# Patient Record
Sex: FEMALE | Race: WHITE | NOT HISPANIC OR LATINO | Employment: OTHER | URBAN - METROPOLITAN AREA
[De-identification: names, ages, dates, MRNs, and addresses within clinical notes are randomized per-mention and may not be internally consistent; named-entity substitution may affect disease eponyms.]

---

## 2021-03-11 ENCOUNTER — TELEPHONE (OUTPATIENT)
Dept: TRANSPLANT | Facility: CLINIC | Age: 61
End: 2021-03-11

## 2021-03-19 ENCOUNTER — TELEPHONE (OUTPATIENT)
Dept: TRANSPLANT | Facility: CLINIC | Age: 61
End: 2021-03-19

## 2021-04-07 ENCOUNTER — TELEPHONE (OUTPATIENT)
Dept: HEPATOLOGY | Facility: CLINIC | Age: 61
End: 2021-04-07

## 2021-04-29 ENCOUNTER — LAB VISIT (OUTPATIENT)
Dept: LAB | Facility: HOSPITAL | Age: 61
End: 2021-04-29
Payer: COMMERCIAL

## 2021-04-29 ENCOUNTER — OFFICE VISIT (OUTPATIENT)
Dept: HEPATOLOGY | Facility: CLINIC | Age: 61
End: 2021-04-29
Payer: COMMERCIAL

## 2021-04-29 VITALS
RESPIRATION RATE: 16 BRPM | DIASTOLIC BLOOD PRESSURE: 77 MMHG | BODY MASS INDEX: 29.41 KG/M2 | SYSTOLIC BLOOD PRESSURE: 116 MMHG | OXYGEN SATURATION: 96 % | HEART RATE: 97 BPM | WEIGHT: 187.38 LBS | HEIGHT: 67 IN

## 2021-04-29 DIAGNOSIS — K74.00 LIVER FIBROSIS: ICD-10-CM

## 2021-04-29 DIAGNOSIS — R74.8 ELEVATED LIVER ENZYMES: ICD-10-CM

## 2021-04-29 DIAGNOSIS — M19.90 ARTHRITIS: ICD-10-CM

## 2021-04-29 DIAGNOSIS — K76.9 LIVER DISEASE, UNSPECIFIED: ICD-10-CM

## 2021-04-29 DIAGNOSIS — R74.8 ELEVATED LIVER ENZYMES: Primary | ICD-10-CM

## 2021-04-29 DIAGNOSIS — M79.7 FIBROMYALGIA: ICD-10-CM

## 2021-04-29 LAB
A1AT SERPL-MCNC: 203 MG/DL (ref 100–190)
AFP SERPL-MCNC: 2.8 NG/ML (ref 0–8.4)
ALBUMIN SERPL BCP-MCNC: 3.4 G/DL (ref 3.5–5.2)
ALP SERPL-CCNC: 294 U/L (ref 55–135)
ALT SERPL W/O P-5'-P-CCNC: 11 U/L (ref 10–44)
ANION GAP SERPL CALC-SCNC: 8 MMOL/L (ref 8–16)
AST SERPL-CCNC: 22 U/L (ref 10–40)
BASOPHILS # BLD AUTO: 0.08 K/UL (ref 0–0.2)
BASOPHILS NFR BLD: 1.1 % (ref 0–1.9)
BILIRUB SERPL-MCNC: 0.4 MG/DL (ref 0.1–1)
BUN SERPL-MCNC: 11 MG/DL (ref 8–23)
CALCIUM SERPL-MCNC: 9.1 MG/DL (ref 8.7–10.5)
CERULOPLASMIN SERPL-MCNC: 44 MG/DL (ref 15–45)
CHLORIDE SERPL-SCNC: 106 MMOL/L (ref 95–110)
CO2 SERPL-SCNC: 25 MMOL/L (ref 23–29)
CREAT SERPL-MCNC: 1.1 MG/DL (ref 0.5–1.4)
CREAT SERPL-MCNC: 1.1 MG/DL (ref 0.5–1.4)
DIFFERENTIAL METHOD: ABNORMAL
EOSINOPHIL # BLD AUTO: 0.5 K/UL (ref 0–0.5)
EOSINOPHIL NFR BLD: 7.2 % (ref 0–8)
ERYTHROCYTE [DISTWIDTH] IN BLOOD BY AUTOMATED COUNT: 15.7 % (ref 11.5–14.5)
EST. GFR  (AFRICAN AMERICAN): >60 ML/MIN/1.73 M^2
EST. GFR  (AFRICAN AMERICAN): >60 ML/MIN/1.73 M^2
EST. GFR  (NON AFRICAN AMERICAN): 54.3 ML/MIN/1.73 M^2
EST. GFR  (NON AFRICAN AMERICAN): 54.3 ML/MIN/1.73 M^2
GLUCOSE SERPL-MCNC: 80 MG/DL (ref 70–110)
HCT VFR BLD AUTO: 44.7 % (ref 37–48.5)
HGB BLD-MCNC: 14.5 G/DL (ref 12–16)
IGG SERPL-MCNC: 969 MG/DL (ref 650–1600)
IMM GRANULOCYTES # BLD AUTO: 0.01 K/UL (ref 0–0.04)
IMM GRANULOCYTES NFR BLD AUTO: 0.1 % (ref 0–0.5)
INR PPP: 0.9 (ref 0.8–1.2)
INR PPP: 0.9 (ref 0.8–1.2)
IRON SERPL-MCNC: 100 UG/DL (ref 30–160)
LYMPHOCYTES # BLD AUTO: 2.5 K/UL (ref 1–4.8)
LYMPHOCYTES NFR BLD: 35 % (ref 18–48)
MCH RBC QN AUTO: 31 PG (ref 27–31)
MCHC RBC AUTO-ENTMCNC: 32.4 G/DL (ref 32–36)
MCV RBC AUTO: 96 FL (ref 82–98)
MONOCYTES # BLD AUTO: 0.4 K/UL (ref 0.3–1)
MONOCYTES NFR BLD: 5.9 % (ref 4–15)
NEUTROPHILS # BLD AUTO: 3.7 K/UL (ref 1.8–7.7)
NEUTROPHILS NFR BLD: 50.7 % (ref 38–73)
NRBC BLD-RTO: 0 /100 WBC
PLATELET # BLD AUTO: 239 K/UL (ref 150–450)
PMV BLD AUTO: 9 FL (ref 9.2–12.9)
POTASSIUM SERPL-SCNC: 4.7 MMOL/L (ref 3.5–5.1)
PROT SERPL-MCNC: 7.2 G/DL (ref 6–8.4)
PROTHROMBIN TIME: 10.3 SEC (ref 9–12.5)
PROTHROMBIN TIME: 10.3 SEC (ref 9–12.5)
RBC # BLD AUTO: 4.68 M/UL (ref 4–5.4)
SATURATED IRON: 28 % (ref 20–50)
SODIUM SERPL-SCNC: 139 MMOL/L (ref 136–145)
TOTAL IRON BINDING CAPACITY: 361 UG/DL (ref 250–450)
TRANSFERRIN SERPL-MCNC: 244 MG/DL (ref 200–375)
WBC # BLD AUTO: 7.25 K/UL (ref 3.9–12.7)

## 2021-04-29 PROCEDURE — 99205 PR OFFICE/OUTPT VISIT, NEW, LEVL V, 60-74 MIN: ICD-10-PCS | Mod: S$GLB,,, | Performed by: INTERNAL MEDICINE

## 2021-04-29 PROCEDURE — 99214 OFFICE O/P EST MOD 30 MIN: CPT | Mod: PBBFAC | Performed by: INTERNAL MEDICINE

## 2021-04-29 PROCEDURE — 80053 COMPREHEN METABOLIC PANEL: CPT | Performed by: INTERNAL MEDICINE

## 2021-04-29 PROCEDURE — 99205 OFFICE O/P NEW HI 60 MIN: CPT | Mod: S$GLB,,, | Performed by: INTERNAL MEDICINE

## 2021-04-29 PROCEDURE — 83540 ASSAY OF IRON: CPT | Performed by: INTERNAL MEDICINE

## 2021-04-29 PROCEDURE — 82390 ASSAY OF CERULOPLASMIN: CPT | Performed by: INTERNAL MEDICINE

## 2021-04-29 PROCEDURE — 85610 PROTHROMBIN TIME: CPT | Performed by: INTERNAL MEDICINE

## 2021-04-29 PROCEDURE — 86038 ANTINUCLEAR ANTIBODIES: CPT | Performed by: INTERNAL MEDICINE

## 2021-04-29 PROCEDURE — 82105 ALPHA-FETOPROTEIN SERUM: CPT | Performed by: INTERNAL MEDICINE

## 2021-04-29 PROCEDURE — 85025 COMPLETE CBC W/AUTO DIFF WBC: CPT | Performed by: INTERNAL MEDICINE

## 2021-04-29 PROCEDURE — 86235 NUCLEAR ANTIGEN ANTIBODY: CPT | Performed by: INTERNAL MEDICINE

## 2021-04-29 PROCEDURE — 82784 ASSAY IGA/IGD/IGG/IGM EACH: CPT | Performed by: INTERNAL MEDICINE

## 2021-04-29 PROCEDURE — 99999 PR PBB SHADOW E&M-EST. PATIENT-LVL IV: ICD-10-PCS | Mod: PBBFAC,,, | Performed by: INTERNAL MEDICINE

## 2021-04-29 PROCEDURE — 80074 ACUTE HEPATITIS PANEL: CPT | Performed by: INTERNAL MEDICINE

## 2021-04-29 PROCEDURE — 99999 PR PBB SHADOW E&M-EST. PATIENT-LVL IV: CPT | Mod: PBBFAC,,, | Performed by: INTERNAL MEDICINE

## 2021-04-29 PROCEDURE — 36415 COLL VENOUS BLD VENIPUNCTURE: CPT | Performed by: INTERNAL MEDICINE

## 2021-04-29 PROCEDURE — 86256 FLUORESCENT ANTIBODY TITER: CPT | Mod: 91 | Performed by: INTERNAL MEDICINE

## 2021-04-29 PROCEDURE — 80321 ALCOHOLS BIOMARKERS 1OR 2: CPT | Performed by: INTERNAL MEDICINE

## 2021-04-29 PROCEDURE — 86376 MICROSOMAL ANTIBODY EACH: CPT | Performed by: INTERNAL MEDICINE

## 2021-04-29 PROCEDURE — 83516 IMMUNOASSAY NONANTIBODY: CPT | Mod: 59 | Performed by: INTERNAL MEDICINE

## 2021-04-29 RX ORDER — CELECOXIB 200 MG/1
200 CAPSULE ORAL 2 TIMES DAILY
COMMUNITY
Start: 2021-04-08 | End: 2022-10-05

## 2021-04-29 RX ORDER — CYCLOBENZAPRINE HCL 10 MG
10 TABLET ORAL DAILY
COMMUNITY
Start: 2020-10-01 | End: 2023-07-24

## 2021-04-29 RX ORDER — PRAVASTATIN SODIUM 20 MG/1
20 TABLET ORAL NIGHTLY
COMMUNITY
Start: 2020-10-01 | End: 2022-08-05 | Stop reason: ALTCHOICE

## 2021-04-29 RX ORDER — GABAPENTIN 600 MG/1
600 TABLET ORAL 3 TIMES DAILY
COMMUNITY
Start: 2020-10-01 | End: 2022-08-05 | Stop reason: ALTCHOICE

## 2021-04-29 RX ORDER — ESTRADIOL 1 MG/1
1 TABLET ORAL DAILY
COMMUNITY
Start: 2020-10-01 | End: 2022-05-17

## 2021-04-29 RX ORDER — FUROSEMIDE 20 MG/1
20 TABLET ORAL DAILY
COMMUNITY
Start: 2020-10-01 | End: 2022-10-05 | Stop reason: SDUPTHER

## 2021-04-29 RX ORDER — ZONISAMIDE 25 MG/1
25 CAPSULE ORAL DAILY
COMMUNITY
Start: 2020-10-01

## 2021-04-29 RX ORDER — DULOXETIN HYDROCHLORIDE 30 MG/1
30 CAPSULE, DELAYED RELEASE ORAL DAILY
COMMUNITY
Start: 2021-02-09 | End: 2022-05-17

## 2021-04-29 RX ORDER — LISINOPRIL 10 MG/1
10 TABLET ORAL DAILY
COMMUNITY
Start: 2020-10-01 | End: 2023-02-03 | Stop reason: SDUPTHER

## 2021-04-29 RX ORDER — ONDANSETRON 4 MG/1
4 TABLET, FILM COATED ORAL
COMMUNITY
Start: 2020-10-01 | End: 2022-05-17

## 2021-04-29 RX ORDER — OMEPRAZOLE 40 MG/1
40 CAPSULE, DELAYED RELEASE ORAL DAILY
COMMUNITY
Start: 2021-04-08 | End: 2022-10-05

## 2021-04-29 RX ORDER — LUBIPROSTONE 24 UG/1
24 CAPSULE, GELATIN COATED ORAL 2 TIMES DAILY
COMMUNITY
Start: 2021-01-27 | End: 2022-08-05

## 2021-04-29 RX ORDER — AMITRIPTYLINE HYDROCHLORIDE 50 MG/1
50 TABLET, FILM COATED ORAL DAILY
COMMUNITY
Start: 2020-10-01 | End: 2022-05-17

## 2021-04-29 RX ORDER — TOPIRAMATE 100 MG/1
100 TABLET, FILM COATED ORAL DAILY
COMMUNITY
Start: 2021-04-01 | End: 2022-05-17

## 2021-04-29 RX ORDER — HYDROMORPHONE HYDROCHLORIDE 4 MG/1
4 TABLET ORAL EVERY 6 HOURS PRN
COMMUNITY
Start: 2021-04-01 | End: 2022-05-17

## 2021-04-30 LAB
HAV IGM SERPL QL IA: NEGATIVE
HBV CORE IGM SERPL QL IA: NEGATIVE
HBV SURFACE AG SERPL QL IA: NEGATIVE
HCV AB SERPL QL IA: NEGATIVE
MITOCHONDRIA AB TITR SER IF: NORMAL {TITER}
SMOOTH MUSCLE AB TITR SER IF: NORMAL {TITER}

## 2021-05-01 LAB — ANA SER QL IF: NORMAL

## 2021-05-03 LAB — LKM AB SER-ACNC: 1.2 UNITS

## 2021-05-04 LAB
GLIADIN PEPTIDE IGA SER-ACNC: 7 UNITS
GLIADIN PEPTIDE IGG SER-ACNC: 2 UNITS
IGA SERPL-MCNC: 249 MG/DL (ref 70–400)
PHOSPHATIDYLETHANOL (PETH): NEGATIVE NG/ML
TTG IGA SER-ACNC: 6 UNITS
TTG IGG SER-ACNC: 2 UNITS

## 2021-05-07 ENCOUNTER — HOSPITAL ENCOUNTER (OUTPATIENT)
Dept: RADIOLOGY | Facility: HOSPITAL | Age: 61
Discharge: HOME | End: 2021-05-07
Attending: INTERNAL MEDICINE
Payer: COMMERCIAL

## 2021-05-07 ENCOUNTER — TELEPHONE (OUTPATIENT)
Dept: HEPATOLOGY | Facility: CLINIC | Age: 61
End: 2021-05-07

## 2021-05-07 DIAGNOSIS — K76.9 LIVER DISEASE, UNSPECIFIED: ICD-10-CM

## 2021-05-07 DIAGNOSIS — K74.00 LIVER FIBROSIS: ICD-10-CM

## 2021-05-07 PROCEDURE — A9698 NON-RAD CONTRAST MATERIALNOC: HCPCS | Performed by: INTERNAL MEDICINE

## 2021-05-07 PROCEDURE — 74170 CT ABD WO CNTRST FLWD CNTRST: CPT | Mod: TC

## 2021-05-07 PROCEDURE — 74170 CT ABDOMEN W WO CONTRAST: ICD-10-PCS | Mod: 26,,, | Performed by: RADIOLOGY

## 2021-05-07 PROCEDURE — 74170 CT ABD WO CNTRST FLWD CNTRST: CPT | Mod: 26,,, | Performed by: RADIOLOGY

## 2021-05-07 PROCEDURE — 25500020 PHARM REV CODE 255: Performed by: INTERNAL MEDICINE

## 2021-05-07 RX ADMIN — IOHEXOL 500 ML: 9 SOLUTION ORAL at 07:05

## 2021-05-07 RX ADMIN — IOHEXOL 75 ML: 350 INJECTION, SOLUTION INTRAVENOUS at 08:05

## 2021-10-14 ENCOUNTER — TELEPHONE (OUTPATIENT)
Dept: HEPATOLOGY | Facility: CLINIC | Age: 61
End: 2021-10-14

## 2021-11-03 ENCOUNTER — LAB VISIT (OUTPATIENT)
Dept: LAB | Facility: HOSPITAL | Age: 61
End: 2021-11-03
Attending: INTERNAL MEDICINE
Payer: COMMERCIAL

## 2021-11-03 ENCOUNTER — OFFICE VISIT (OUTPATIENT)
Dept: HEPATOLOGY | Facility: CLINIC | Age: 61
End: 2021-11-03
Payer: COMMERCIAL

## 2021-11-03 VITALS
DIASTOLIC BLOOD PRESSURE: 65 MMHG | TEMPERATURE: 98 F | HEIGHT: 67 IN | OXYGEN SATURATION: 98 % | WEIGHT: 171.31 LBS | HEART RATE: 82 BPM | SYSTOLIC BLOOD PRESSURE: 105 MMHG | RESPIRATION RATE: 18 BRPM | BODY MASS INDEX: 26.89 KG/M2

## 2021-11-03 DIAGNOSIS — R74.8 ELEVATED LIVER ENZYMES: ICD-10-CM

## 2021-11-03 DIAGNOSIS — K74.00 LIVER FIBROSIS: ICD-10-CM

## 2021-11-03 DIAGNOSIS — K76.0 FATTY LIVER: ICD-10-CM

## 2021-11-03 DIAGNOSIS — K76.9 LIVER LESION: Primary | ICD-10-CM

## 2021-11-03 LAB
ALBUMIN SERPL BCP-MCNC: 3.8 G/DL (ref 3.5–5.2)
ALP SERPL-CCNC: 385 U/L (ref 55–135)
ALT SERPL W/O P-5'-P-CCNC: 17 U/L (ref 10–44)
ANION GAP SERPL CALC-SCNC: 11 MMOL/L (ref 8–16)
AST SERPL-CCNC: 27 U/L (ref 10–40)
BASOPHILS # BLD AUTO: 0.13 K/UL (ref 0–0.2)
BASOPHILS NFR BLD: 0.9 % (ref 0–1.9)
BILIRUB SERPL-MCNC: 0.5 MG/DL (ref 0.1–1)
BUN SERPL-MCNC: 13 MG/DL (ref 8–23)
CALCIUM SERPL-MCNC: 10 MG/DL (ref 8.7–10.5)
CHLORIDE SERPL-SCNC: 104 MMOL/L (ref 95–110)
CO2 SERPL-SCNC: 26 MMOL/L (ref 23–29)
CREAT SERPL-MCNC: 1.2 MG/DL (ref 0.5–1.4)
DIFFERENTIAL METHOD: ABNORMAL
EOSINOPHIL # BLD AUTO: 1.1 K/UL (ref 0–0.5)
EOSINOPHIL NFR BLD: 8 % (ref 0–8)
ERYTHROCYTE [DISTWIDTH] IN BLOOD BY AUTOMATED COUNT: 13.9 % (ref 11.5–14.5)
EST. GFR  (AFRICAN AMERICAN): 56.4 ML/MIN/1.73 M^2
EST. GFR  (NON AFRICAN AMERICAN): 48.9 ML/MIN/1.73 M^2
GLUCOSE SERPL-MCNC: 74 MG/DL (ref 70–110)
HCT VFR BLD AUTO: 46.3 % (ref 37–48.5)
HGB BLD-MCNC: 14.9 G/DL (ref 12–16)
IMM GRANULOCYTES # BLD AUTO: 0.04 K/UL (ref 0–0.04)
IMM GRANULOCYTES NFR BLD AUTO: 0.3 % (ref 0–0.5)
INR PPP: 0.9 (ref 0.8–1.2)
LYMPHOCYTES # BLD AUTO: 3.5 K/UL (ref 1–4.8)
LYMPHOCYTES NFR BLD: 24.3 % (ref 18–48)
MCH RBC QN AUTO: 33 PG (ref 27–31)
MCHC RBC AUTO-ENTMCNC: 32.2 G/DL (ref 32–36)
MCV RBC AUTO: 103 FL (ref 82–98)
MONOCYTES # BLD AUTO: 1.1 K/UL (ref 0.3–1)
MONOCYTES NFR BLD: 7.6 % (ref 4–15)
NEUTROPHILS # BLD AUTO: 8.4 K/UL (ref 1.8–7.7)
NEUTROPHILS NFR BLD: 58.9 % (ref 38–73)
NRBC BLD-RTO: 0 /100 WBC
PLATELET # BLD AUTO: 229 K/UL (ref 150–450)
PMV BLD AUTO: 9.2 FL (ref 9.2–12.9)
POTASSIUM SERPL-SCNC: 4.6 MMOL/L (ref 3.5–5.1)
PROT SERPL-MCNC: 7.2 G/DL (ref 6–8.4)
PROTHROMBIN TIME: 10 SEC (ref 9–12.5)
RBC # BLD AUTO: 4.51 M/UL (ref 4–5.4)
SODIUM SERPL-SCNC: 141 MMOL/L (ref 136–145)
WBC # BLD AUTO: 14.31 K/UL (ref 3.9–12.7)

## 2021-11-03 PROCEDURE — 3078F PR MOST RECENT DIASTOLIC BLOOD PRESSURE < 80 MM HG: ICD-10-PCS | Mod: CPTII,S$GLB,, | Performed by: INTERNAL MEDICINE

## 2021-11-03 PROCEDURE — 85610 PROTHROMBIN TIME: CPT | Performed by: INTERNAL MEDICINE

## 2021-11-03 PROCEDURE — 80053 COMPREHEN METABOLIC PANEL: CPT | Performed by: INTERNAL MEDICINE

## 2021-11-03 PROCEDURE — 3074F SYST BP LT 130 MM HG: CPT | Mod: CPTII,S$GLB,, | Performed by: INTERNAL MEDICINE

## 2021-11-03 PROCEDURE — 3078F DIAST BP <80 MM HG: CPT | Mod: CPTII,S$GLB,, | Performed by: INTERNAL MEDICINE

## 2021-11-03 PROCEDURE — 4010F PR ACE/ARB THEARPY RXD/TAKEN: ICD-10-PCS | Mod: CPTII,S$GLB,, | Performed by: INTERNAL MEDICINE

## 2021-11-03 PROCEDURE — 99999 PR PBB SHADOW E&M-EST. PATIENT-LVL IV: CPT | Mod: PBBFAC,,, | Performed by: INTERNAL MEDICINE

## 2021-11-03 PROCEDURE — 1159F PR MEDICATION LIST DOCUMENTED IN MEDICAL RECORD: ICD-10-PCS | Mod: CPTII,S$GLB,, | Performed by: INTERNAL MEDICINE

## 2021-11-03 PROCEDURE — 4010F ACE/ARB THERAPY RXD/TAKEN: CPT | Mod: CPTII,S$GLB,, | Performed by: INTERNAL MEDICINE

## 2021-11-03 PROCEDURE — 3008F PR BODY MASS INDEX (BMI) DOCUMENTED: ICD-10-PCS | Mod: CPTII,S$GLB,, | Performed by: INTERNAL MEDICINE

## 2021-11-03 PROCEDURE — 3074F PR MOST RECENT SYSTOLIC BLOOD PRESSURE < 130 MM HG: ICD-10-PCS | Mod: CPTII,S$GLB,, | Performed by: INTERNAL MEDICINE

## 2021-11-03 PROCEDURE — 36415 COLL VENOUS BLD VENIPUNCTURE: CPT | Performed by: INTERNAL MEDICINE

## 2021-11-03 PROCEDURE — 99215 OFFICE O/P EST HI 40 MIN: CPT | Mod: S$GLB,,, | Performed by: INTERNAL MEDICINE

## 2021-11-03 PROCEDURE — 99999 PR PBB SHADOW E&M-EST. PATIENT-LVL IV: ICD-10-PCS | Mod: PBBFAC,,, | Performed by: INTERNAL MEDICINE

## 2021-11-03 PROCEDURE — 85025 COMPLETE CBC W/AUTO DIFF WBC: CPT | Performed by: INTERNAL MEDICINE

## 2021-11-03 PROCEDURE — 3008F BODY MASS INDEX DOCD: CPT | Mod: CPTII,S$GLB,, | Performed by: INTERNAL MEDICINE

## 2021-11-03 PROCEDURE — 1159F MED LIST DOCD IN RCRD: CPT | Mod: CPTII,S$GLB,, | Performed by: INTERNAL MEDICINE

## 2021-11-03 PROCEDURE — 99215 PR OFFICE/OUTPT VISIT, EST, LEVL V, 40-54 MIN: ICD-10-PCS | Mod: S$GLB,,, | Performed by: INTERNAL MEDICINE

## 2021-11-03 RX ORDER — TRAZODONE HYDROCHLORIDE 50 MG/1
TABLET ORAL
COMMUNITY
End: 2023-02-03

## 2022-02-14 ENCOUNTER — TELEPHONE (OUTPATIENT)
Dept: HEPATOLOGY | Facility: CLINIC | Age: 62
End: 2022-02-14
Payer: COMMERCIAL

## 2022-02-14 ENCOUNTER — NURSE TRIAGE (OUTPATIENT)
Dept: ADMINISTRATIVE | Facility: CLINIC | Age: 62
End: 2022-02-14
Payer: COMMERCIAL

## 2022-02-14 NOTE — TELEPHONE ENCOUNTER
Call returned to the patient. Patient stated she has been turned down 4 x's for [employer] due to being on mycophenolate, Txp, Txp candidate.  Message relayed from Dr Montgomery:  She is not transplant candidate and we never prescribed her MMF. Insurance may be confusing. We can send last note from hepatology to her insurance. I don't think her records from [facility] mention anything like that.    Patient stated she will try to reach out to her Pharmacy and will check with Providers in [place].

## 2022-02-14 NOTE — TELEPHONE ENCOUNTER
I called the patient and left her a message to call me back at [phone]     Marty Montgomery MD  Transplant Hepatology   Gastroenterology  [facility]  Office:[phone]  Fax:[phone]

## 2022-02-14 NOTE — TELEPHONE ENCOUNTER
Patient is calling today because she is applying for life insurance and she keeps getting turned down, and the reason that they give is that she has had a liver transplant or that she is on mycophenolate.  She confirms that she has not had a liver txp and I can not find any evidence on her medication record that she has ever rec'd mycophenolate.  She is wondering if there's a mistake in her records or if she did receive this medication at any point, would like Dr. Montgomery to clarify for her, and see if there's anything that can be done.  Please call her back @ [phone]   Reason for Disposition   Caller has NON-URGENT medicine question about med that PCP or specialist prescribed and triager unable to answer question    Additional Information   Negative: Intentional drug overdose and suicidal thoughts or ideas   Negative: MORE THAN A DOUBLE DOSE of a prescription or over-the-counter (OTC) drug   Negative: DOUBLE DOSE (an extra dose or lesser amount) of prescription drug and any symptoms (e.g., dizziness, nausea, pain, sleepiness)   Negative: DOUBLE DOSE (an extra dose or lesser amount) of over-the-counter (OTC) drug and any symptoms (e.g., dizziness, nausea, pain, sleepiness)   Negative: Took another person's prescription drug   Negative: DOUBLE DOSE (an extra dose or lesser amount) of prescription drug and NO symptoms (Exception: a double dose of antibiotics)   Negative: Diabetes drug error or overdose (e.g., took wrong type of insulin or took extra dose)   Negative: Caller has medication question about med not prescribed by PCP and triager unable to answer question (e.g., compatibility with other med, storage)   Negative: Prescription refill request for ESSENTIAL medicine (i.e., likelihood of harm to patient if not taken) and triager unable to refill per department policy   Negative: Prescription not at pharmacy and was prescribed by PCP recently (Exception: triager has access to EMR and prescription  is recorded there. Go to Home Care and confirm for pharmacy.)   Negative: Pharmacy calling with prescription question and triager unable to answer question   Negative: Caller has URGENT medicine question about med that PCP or specialist prescribed and triager unable to answer question    Protocols used: MEDICATION QUESTION CALL-A-[place]

## 2022-05-02 ENCOUNTER — TELEPHONE (OUTPATIENT)
Dept: HEPATOLOGY | Facility: CLINIC | Age: 62
End: 2022-05-02
Payer: COMMERCIAL

## 2022-05-02 DIAGNOSIS — K76.0 FATTY LIVER: ICD-10-CM

## 2022-05-02 DIAGNOSIS — R74.8 ELEVATED LIVER ENZYMES: ICD-10-CM

## 2022-05-02 DIAGNOSIS — K74.00 LIVER FIBROSIS: Primary | ICD-10-CM

## 2022-05-02 DIAGNOSIS — K76.9 LIVER LESION: ICD-10-CM

## 2022-05-14 ENCOUNTER — PATIENT MESSAGE (OUTPATIENT)
Dept: HEPATOLOGY | Facility: CLINIC | Age: 62
End: 2022-05-14
Payer: COMMERCIAL

## 2022-05-17 ENCOUNTER — TELEPHONE (OUTPATIENT)
Dept: HEPATOLOGY | Facility: CLINIC | Age: 62
End: 2022-05-17
Payer: COMMERCIAL

## 2022-05-17 ENCOUNTER — OFFICE VISIT (OUTPATIENT)
Dept: HEPATOLOGY | Facility: CLINIC | Age: 62
End: 2022-05-17
Payer: COMMERCIAL

## 2022-05-17 DIAGNOSIS — D18.03 HEPATIC HEMANGIOMA: ICD-10-CM

## 2022-05-17 DIAGNOSIS — K76.0 FATTY LIVER: ICD-10-CM

## 2022-05-17 DIAGNOSIS — R74.8 ELEVATED ALKALINE PHOSPHATASE LEVEL: ICD-10-CM

## 2022-05-17 DIAGNOSIS — K74.00 LIVER FIBROSIS: Primary | ICD-10-CM

## 2022-05-17 PROBLEM — D18.00 HEMANGIOMA: Status: ACTIVE | Noted: 2022-05-17

## 2022-05-17 PROBLEM — K76.9 LIVER LESION: Status: ACTIVE | Noted: 2022-05-17

## 2022-05-17 PROCEDURE — 1160F RVW MEDS BY RX/DR IN RCRD: CPT | Mod: CPTII,95,, | Performed by: NURSE PRACTITIONER

## 2022-05-17 PROCEDURE — 4010F ACE/ARB THERAPY RXD/TAKEN: CPT | Mod: CPTII,95,, | Performed by: NURSE PRACTITIONER

## 2022-05-17 PROCEDURE — 1160F PR REVIEW ALL MEDS BY PRESCRIBER/CLIN PHARMACIST DOCUMENTED: ICD-10-PCS | Mod: CPTII,95,, | Performed by: NURSE PRACTITIONER

## 2022-05-17 PROCEDURE — 1159F MED LIST DOCD IN RCRD: CPT | Mod: CPTII,95,, | Performed by: NURSE PRACTITIONER

## 2022-05-17 PROCEDURE — 99214 OFFICE O/P EST MOD 30 MIN: CPT | Mod: 95,,, | Performed by: NURSE PRACTITIONER

## 2022-05-17 PROCEDURE — 1159F PR MEDICATION LIST DOCUMENTED IN MEDICAL RECORD: ICD-10-PCS | Mod: CPTII,95,, | Performed by: NURSE PRACTITIONER

## 2022-05-17 PROCEDURE — 99214 PR OFFICE/OUTPT VISIT, EST, LEVL IV, 30-39 MIN: ICD-10-PCS | Mod: 95,,, | Performed by: NURSE PRACTITIONER

## 2022-05-17 PROCEDURE — 4010F PR ACE/ARB THEARPY RXD/TAKEN: ICD-10-PCS | Mod: CPTII,95,, | Performed by: NURSE PRACTITIONER

## 2022-05-17 NOTE — TELEPHONE ENCOUNTER
Returned to patient's call. Informed her that we received the results of her labs taken on 5/6. She is scheduled for a virtual visit today so she might need to schedule fibroscan in some other time.

## 2022-05-17 NOTE — PATIENT INSTRUCTIONS
- Schedule MRI of the abdomen with MRCP to evaluate elevated alkaline phosphatase.  - Repeat liver function tests in 3 months.  - Will also check titer levels for Hepatitis A and B, and arrange for vaccination if needed.  - Avoid alcohol and herbal supplements/alternative remedies.  - Return to clinic to be determined by imaging results.

## 2022-05-17 NOTE — PROGRESS NOTES
The patient location is: Home ([place], [place])  The chief complaint leading to consultation is: Liver fibrosis    Visit type: audiovisual - the majority of the visit was conducted by phone, due to audio issues on the provider's end.    Face to Face time with patient: 15  30 minutes of total time spent on the encounter, which includes face to face time and non-face to face time preparing to see the patient (eg, review of tests), Obtaining and/or reviewing separately obtained history, Documenting clinical information in the electronic or other health record, Independently interpreting results (not separately reported) and communicating results to the patient/family/caregiver, or Care coordination (not separately reported).     Each patient to whom he or she provides medical services by telemedicine is:  (1) informed of the relationship between the physician and patient and the respective role of any other health care provider with respect to management of the patient; and (2) notified that he or she may decline to receive medical services by telemedicine and may withdraw from such care at any time.    Notes:       [facility] VISIT ESTABLISHED PT NOTE    REFERRING PROVIDER:  No ref. provider found    CHIEF COMPLAINT: Liver fibrosis    HPI: This is a 61 y.o. White female with PMH noted below, presenting for follow up for liver fibrosis. She was last seen in clinic by Dr. Montgomery in 11/2021. She has a history of fatty liver with stage 3 fibrosis on liver biopsy in 10/2020. The liver biopsy was done during laproscopic cholecystectomy on 10/27/2020, and showed chronic portal inflammation with interface activity, with bridging fibrosis. Serological work up was negative for autoimmune and viral etiologies. Recent labs show normal liver function, with a persistently elevated alkaline phosphatase (> 300); her transaminases are normal. She also had an incidental finding of hemangiomas on cross sectional imaging  (last done in 5/2021). She does not have any new complains from liver standpoint. She is well appearing, and denies any signs or symptoms of hepatic decompensation including jaundice, dark urine, abdominal distention, lower extremity edema, hematemesis, melena, or periods of confusion suggestive of hepatic encephalopathy.     Review of patient's allergies indicates:   Allergen Reactions    Sumatriptan succinate      Current Outpatient Medications on File Prior to Visit   Medication Sig Dispense Refill    AMITIZA 24 mcg Cap Take 24 mcg by mouth 2 (two) times daily.      celecoxib (CELEBREX) 200 MG capsule Take 200 mg by mouth 2 (two) times daily.      cyclobenzaprine (FLEXERIL) 10 MG tablet Take 10 mg by mouth once daily.      furosemide (LASIX) 20 MG tablet Take 20 mg by mouth once daily.      gabapentin (NEURONTIN) 600 MG tablet Take 600 mg by mouth 3 (three) times daily.      lisinopriL 10 MG tablet Take 10 mg by mouth once daily.      omeprazole (PRILOSEC) 40 MG capsule Take 40 mg by mouth once daily.      pravastatin (PRAVACHOL) 20 MG tablet Take 20 mg by mouth every evening.      traZODone (DESYREL) 50 MG tablet trazodone 50 mg tablet      zonisamide (ZONEGRAN) 25 MG Cap Take 25 mg by mouth once daily.      [DISCONTINUED] amitriptyline (ELAVIL) 50 MG tablet Take 50 mg by mouth once daily.      [DISCONTINUED] DULoxetine (CYMBALTA) 30 MG capsule Take 30 mg by mouth once daily.      [DISCONTINUED] estradioL (ESTRACE) 1 MG tablet Take 1 mg by mouth once daily.      [DISCONTINUED] HYDROmorphone (DILAUDID) 4 MG tablet Take 4 mg by mouth every 6 (six) hours as needed.      [DISCONTINUED] ondansetron (ZOFRAN) 4 MG tablet Take 4 mg by mouth as needed.      [DISCONTINUED] topiramate (TOPAMAX) 100 MG tablet Take 100 mg by mouth once daily.       No current facility-administered medications on file prior to visit.     PMHX:  has a past medical history of Advanced hepatic fibrosis, Arthritis, Chronic back  pain, Fatty liver, Fibromyalgia, GERD (gastroesophageal reflux disease), and HTN (hypertension).    PSHX:  has a past surgical history that includes Laparoscopic cholecystectomy with cholangiography (10/27/2020) and Liver biopsy (10/27/2020).    FAMILY HISTORY: Negative for liver disease, reviewed in EPIC    SOCIAL HISTORY:   Social History     Tobacco Use   Smoking Status Current Every Day Smoker    Packs/day: 1.00    Types: Cigarettes   Smokeless Tobacco Not on file       Social History     Substance and Sexual Activity   Alcohol Use Not on file       Social History     Substance and Sexual Activity   Drug Use Not on file     ROS:   GENERAL: Denies fever, chills, weight loss/gain, fatigue  HEENT: Denies headaches, dizziness, vision/hearing changes  CARDIOVASCULAR: Denies chest pain, palpitations, or edema  RESPIRATORY: Denies dyspnea, cough  GI: Denies abdominal pain, rectal bleeding, nausea, vomiting. No change in bowel pattern or color  : Denies dysuria, hematuria   SKIN: Denies rash, itching   NEURO: Denies confusion, memory loss, or mood changes  PSYCH: Denies depression or anxiety  HEME/LYMPH: Denies easy bruising or bleeding    PHYSICAL EXAM:   Friendly White female, in no acute distress; alert and oriented to person, place and time.  VITALS: There were no vitals taken for this visit. (Not done - Telehealth visit).  HENT: Normocephalic, without obvious abnormality.   EYES: Sclerae anicteric.  NECK: No obvious masses.  CARDIOVASCULAR: No peripheral edema, per patient report.  RESPIRATORY: Normal respiratory effort.   GI: No abdominal distention per patient report.  EXTREMITIES: Unable to assess.  SKIN: Warm and dry. No jaundice. No rashes noted to exposed skin.  NEURO: Unable to assess.  PSYCH:  Memory intact. Thought and speech pattern appropriate. Behavior normal. No depression or anxiety noted.    DIAGNOSTIC STUDIES:    LIVER BIOPSY 10/27/2020:        CT Abdomen With Without Contrast  Narrative:  EXAMINATION:  CT ABDOMEN W WO CONTRAST    CLINICAL HISTORY:  Liver lesion, > 1cm, US nondiagnostic; Hepatic fibrosis, unspecified    TECHNIQUE:  Low dose axial images, sagittal and coronal reformations were obtained from the lung bases to the iliac crest following the IV administration of 75 mL of Omnipaque 350 and the oral administration of 500 cc Omnipaque 9.   Noncontrast, arterial, portal venous and delayed phases were acquired over the abdomen.    COMPARISON:  None    FINDINGS:  There is a bilobed structure of low attenuation in the right posterior lobe of the liver, segment 6 measuring 2.9 x 1.8 x 1.7 cm.  Following the administration of IV contrast it demonstrates nodular peripheral enhancement, and is homogeneously hyperattenuating compared to the remainder of the hepatic parenchyma on the delayed images.  This is consistent with a hemangioma.  In the extreme dome of the liver, series 5, image 12 and series 12, image 37, segment 8, there is a 7 mm liver lesion which is likely a hemangioma as well but is so small that it is difficult to characterize.    There is calcific plaque in the coronary arteries.  The visualized portions of the lung bases are clear.  There is elevation of the right hemidiaphragm.    The gallbladder is surgically absent.  The spleen, adrenal glands, pancreas are normal.    There is extensive bilateral renal cortical scarring, particularly involving the upper poles of the kidneys.  There is a 12 mm left posterior upper pole renal cyst with punctate rim calcification.  There is a 3 mm calcification at the posterior midpole of the right kidney which may be within the collecting system, with overlying cortical scarring, or in the cortex.  There is a 2.2 cm right midpole renal cyst.    The aorta is normal in caliber with extensive calcific plaque.  No significant findings are noted in the visualized portions of the bowel.  There is no ascites in the upper abdomen.  Borderline sized  peripancreatic and portacaval lymph nodes are noted with short axis diameters of 10-11 mm, series 5, images 35 and 37.  Impression: 2.9 cm right posterior lobe hepatic hemangioma.    7 mm liver lesion in segment 8 is likely a hemangioma as well but is difficult to characterize secondary to its small size.    Atherosclerosis.  Cholecystectomy.  Renal cortical scarring.    Simple and minimally complex right renal cysts.  Right renal calculus versus cortical calcification.    Electronically signed by: Gracia Bojorquez  Date:    05/07/2021  Time:    08:51    ASSESSMENT & PLAN:  61 y.o. White female with:    1. Liver fibrosis  MRI Abdomen W WO Contrast_INC MRCP    AFP Tumor Marker    Comprehensive Metabolic Panel    CBC Auto Differential    Protime-INR    Hepatitis A antibody, IgG    Hepatitis B Surface Antibody, Qual/Quant    Hepatitis B Core Antibody, Total   2. Elevated alkaline phosphatase level  MRI Abdomen W WO Contrast_INC MRCP    AFP Tumor Marker    Comprehensive Metabolic Panel    CBC Auto Differential    Protime-INR    Hepatitis A antibody, IgG    Hepatitis B Surface Antibody, Qual/Quant    Hepatitis B Core Antibody, Total   3. Fatty liver  MRI Abdomen W WO Contrast_INC MRCP    AFP Tumor Marker    Comprehensive Metabolic Panel    CBC Auto Differential    Protime-INR    Hepatitis A antibody, IgG    Hepatitis B Surface Antibody, Qual/Quant    Hepatitis B Core Antibody, Total   4. Hepatic hemangioma  MRI Abdomen W WO Contrast_INC MRCP    AFP Tumor Marker    Comprehensive Metabolic Panel    CBC Auto Differential    Protime-INR    Hepatitis A antibody, IgG    Hepatitis B Surface Antibody, Qual/Quant    Hepatitis B Core Antibody, Total     - Schedule MRI of the abdomen with MRCP to evaluate elevated alkaline phosphatase.  - Repeat liver function tests in 3 months.  - Will also check titer levels for Hepatitis A and B, and arrange for vaccination if needed.  - Avoid alcohol and herbal supplements/alternative  remedies.  - Return to clinic to be determined by imaging results.    Thank you for allowing me to participate in the care of Desirae Etienne       Hepatology Nurse Practitioner  [facility]  5/17/2022 @ [id number]    CC'ed note to:   No ref. provider found  Primary Doctor No

## 2022-05-17 NOTE — TELEPHONE ENCOUNTER
----- Message from Lonnie Grewal sent at 5/17/2022  9:17 AM CDT -----  Pt would like to speak to staff regarding labs taken 5/6 and if Fibroscan is needed prior to today's appt    Call: [phone]

## 2022-05-18 ENCOUNTER — TELEPHONE (OUTPATIENT)
Dept: HEPATOLOGY | Facility: CLINIC | Age: 62
End: 2022-05-18
Payer: COMMERCIAL

## 2022-05-18 DIAGNOSIS — D18.03 HEPATIC HEMANGIOMA: ICD-10-CM

## 2022-05-18 DIAGNOSIS — R74.8 ELEVATED ALKALINE PHOSPHATASE LEVEL: ICD-10-CM

## 2022-05-18 DIAGNOSIS — K76.0 FATTY LIVER: ICD-10-CM

## 2022-05-18 DIAGNOSIS — K74.00 LIVER FIBROSIS: Primary | ICD-10-CM

## 2022-05-18 NOTE — TELEPHONE ENCOUNTER
----- Message from Neha Hall NP sent at 5/17/2022  3:42 PM CDT -----  Please arrange MRI abdomen with MRCP at [facility] in [place], [place]. Thanks!

## 2022-05-18 NOTE — TELEPHONE ENCOUNTER
Spoke with patient to let her know that I faxed the mri and labs order to [facility] in [place] and also mailed orders to her. Instructed her to do mri soon and do labs in 3 months. Patient verbalized understanding.

## 2022-06-01 ENCOUNTER — TELEPHONE (OUTPATIENT)
Dept: HEPATOLOGY | Facility: CLINIC | Age: 62
End: 2022-06-01
Payer: COMMERCIAL

## 2022-06-01 NOTE — TELEPHONE ENCOUNTER
----- Message from Neha Hall NP sent at 5/31/2022 11:57 AM CDT -----  Regarding: RE: CALL BACK  Gary Pradhan, we don't do PA in our department, she should contact pre-service department I believe. Please let her know.    Thanks,    Neha  ----- Message -----  From: Lorne Montiel MA  Sent: 5/31/2022  11:44 AM CDT  To: Neha Hall NP  Subject: FW: CALL BACK                                      ----- Message -----  From: Petey Tesfaye  Sent: 5/31/2022  11:34 AM CDT  To: Abhijit Solomon Staff  Subject: CALL BACK                                        Norma with [facility] in [place] call in regards to a PA needed CPT code 18929    Call

## 2022-06-01 NOTE — TELEPHONE ENCOUNTER
Returned call to Norma of [facility]. Gave her the pre-service department number to obtain PA.

## 2022-06-07 DIAGNOSIS — R74.8 ELEVATED ALKALINE PHOSPHATASE LEVEL: Primary | ICD-10-CM

## 2022-06-07 DIAGNOSIS — D18.03 HEPATIC HEMANGIOMA: ICD-10-CM

## 2022-06-07 DIAGNOSIS — K74.00 LIVER FIBROSIS: ICD-10-CM

## 2022-06-07 DIAGNOSIS — K76.0 FATTY LIVER: ICD-10-CM

## 2022-06-08 ENCOUNTER — TELEPHONE (OUTPATIENT)
Dept: HEPATOLOGY | Facility: CLINIC | Age: 62
End: 2022-06-08
Payer: MEDICARE

## 2022-06-08 NOTE — TELEPHONE ENCOUNTER
----- Message from Neha Hall NP sent at 6/7/2022  5:03 PM CDT -----  Regarding: RE: Lab orders  Thanks, I placed separate order for MRI and MRCP. Please let her know.     Neha  ----- Message -----  From: Lorne Montiel MA  Sent: 6/7/2022   4:15 PM CDT  To: Neha Hall NP  Subject: FW: Lab orders                                     ----- Message -----  From: Tammy Zazueta  Sent: 6/7/2022   3:57 PM CDT  To: Abhijit Solomon Staff  Subject: Lab orders                                       Aleena @ [facility] MRI Department regarding MRI Abdomen W WO Contrast_INC MRCP. Needing another order saying Abdomen WO MRCP    Call: [phone]  Fax: [phone] Attn: MRI

## 2022-06-10 ENCOUNTER — PATIENT MESSAGE (OUTPATIENT)
Dept: HEPATOLOGY | Facility: CLINIC | Age: 62
End: 2022-06-10
Payer: MEDICARE

## 2022-06-16 ENCOUNTER — TELEPHONE (OUTPATIENT)
Dept: HEPATOLOGY | Facility: CLINIC | Age: 62
End: 2022-06-16
Payer: MEDICARE

## 2022-06-16 NOTE — TELEPHONE ENCOUNTER
----- Message from Neha Hall NP sent at 6/15/2022  6:09 PM CDT -----  Contact: Patient  Please let the patient know that there were no concerning findings on the MRI. I will need to see her back in clinic for a virtual visit to discuss results further and arrange a follow up plan of care.     Thanks!    Neha  ----- Message -----  From: Lorne Montiel MA  Sent: 6/15/2022   2:11 PM CDT  To: Neha Hall NP      ----- Message -----  From: Mena Mckay  Sent: 6/15/2022   1:53 PM CDT  To: Abhijit Solomon Staff    Type:  Test Results    Who Called: Patient   Name of Test (Lab/Mammo/Etc): lab ,Mri w/wo contrast   Would the patient rather a call back or a response via [facility]? Call back   Best Call Back Number: [phone]  Additional Information:  Please assist

## 2022-06-16 NOTE — TELEPHONE ENCOUNTER
Contacted patient and relayed provider messages. Got her scheduled for a virtual visit. Mailed appt letter to patient.

## 2022-06-21 ENCOUNTER — TELEPHONE (OUTPATIENT)
Dept: HEPATOLOGY | Facility: CLINIC | Age: 62
End: 2022-06-21
Payer: MEDICARE

## 2022-06-21 NOTE — TELEPHONE ENCOUNTER
----- Message from Jair Stanton sent at 6/21/2022 10:39 AM CDT -----       Type: Patient Returning Call    Who Called: Pt  Who Left Message for Patient: NA  Does the patient know what this is regarding?: Changing appointment on 06/29/2022 to an audio only appointment.   Would the patient rather a call back or a response via [facility]? Call  Best Call Back Number: [phone]  Additional Information: Please assist, thank you!

## 2022-06-29 ENCOUNTER — OFFICE VISIT (OUTPATIENT)
Dept: HEPATOLOGY | Facility: CLINIC | Age: 62
End: 2022-06-29
Payer: COMMERCIAL

## 2022-06-29 DIAGNOSIS — Z85.05 ENCOUNTER FOR FOLLOW-UP SURVEILLANCE OF LIVER CANCER: ICD-10-CM

## 2022-06-29 DIAGNOSIS — Z08 ENCOUNTER FOR FOLLOW-UP SURVEILLANCE OF LIVER CANCER: ICD-10-CM

## 2022-06-29 DIAGNOSIS — K74.00 LIVER FIBROSIS: ICD-10-CM

## 2022-06-29 DIAGNOSIS — K76.0 FATTY LIVER: ICD-10-CM

## 2022-06-29 DIAGNOSIS — Z23 NEED FOR HEPATITIS B VACCINATION: ICD-10-CM

## 2022-06-29 DIAGNOSIS — D18.03 HEPATIC HEMANGIOMA: ICD-10-CM

## 2022-06-29 DIAGNOSIS — R74.8 ELEVATED ALKALINE PHOSPHATASE LEVEL: Primary | ICD-10-CM

## 2022-06-29 DIAGNOSIS — K83.8 INTRAHEPATIC BILE DUCT DILATION: ICD-10-CM

## 2022-06-29 PROCEDURE — 4010F PR ACE/ARB THEARPY RXD/TAKEN: ICD-10-PCS | Mod: CPTII,95,, | Performed by: NURSE PRACTITIONER

## 2022-06-29 PROCEDURE — 1160F PR REVIEW ALL MEDS BY PRESCRIBER/CLIN PHARMACIST DOCUMENTED: ICD-10-PCS | Mod: CPTII,95,, | Performed by: NURSE PRACTITIONER

## 2022-06-29 PROCEDURE — 4010F ACE/ARB THERAPY RXD/TAKEN: CPT | Mod: CPTII,95,, | Performed by: NURSE PRACTITIONER

## 2022-06-29 PROCEDURE — 99214 OFFICE O/P EST MOD 30 MIN: CPT | Mod: 95,,, | Performed by: NURSE PRACTITIONER

## 2022-06-29 PROCEDURE — 1160F RVW MEDS BY RX/DR IN RCRD: CPT | Mod: CPTII,95,, | Performed by: NURSE PRACTITIONER

## 2022-06-29 PROCEDURE — 99214 PR OFFICE/OUTPT VISIT, EST, LEVL IV, 30-39 MIN: ICD-10-PCS | Mod: 95,,, | Performed by: NURSE PRACTITIONER

## 2022-06-29 PROCEDURE — 1159F MED LIST DOCD IN RCRD: CPT | Mod: CPTII,95,, | Performed by: NURSE PRACTITIONER

## 2022-06-29 PROCEDURE — 1159F PR MEDICATION LIST DOCUMENTED IN MEDICAL RECORD: ICD-10-PCS | Mod: CPTII,95,, | Performed by: NURSE PRACTITIONER

## 2022-06-29 NOTE — Clinical Note
Good Afternoon Yuliana,  Please contact patient to arrange EUS with ERCP for further evaluation of elevated ALP, in the setting of mild intrahepatic biliary ductal dilatation.   Thank You!  Neha

## 2022-06-29 NOTE — Clinical Note
Please arrange labs in 3 months (will need to fax external orders to [facility]), with a f/u visit with me 1 week later to review results (virtual visit okay). Thanks!

## 2022-06-29 NOTE — PROGRESS NOTES
The patient location is: Home ([place], [place])  The chief complaint leading to consultation is: Liver fibrosis    Visit type: audiovisual - the majority of the visit was conducted by phone, due to audio issues on the patient's end.    Face to Face time with patient: 15  30 minutes of total time spent on the encounter, which includes face to face time and non-face to face time preparing to see the patient (eg, review of tests), Obtaining and/or reviewing separately obtained history, Documenting clinical information in the electronic or other health record, Independently interpreting results (not separately reported) and communicating results to the patient/family/caregiver, or Care coordination (not separately reported).     Each patient to whom he or she provides medical services by telemedicine is:  (1) informed of the relationship between the physician and patient and the respective role of any other health care provider with respect to management of the patient; and (2) notified that he or she may decline to receive medical services by telemedicine and may withdraw from such care at any time.    Notes:       [facility] VISIT ESTABLISHED PT NOTE    REFERRING PROVIDER:  No ref. provider found    CHIEF COMPLAINT: Liver fibrosis    HPI: This is a 61 y.o. White female with PMH noted below, presenting for follow up. She was last seen in clinic by myself for a virtual visit on 5/17/2022.     She has a history of fatty liver with stage 3 fibrosis on liver biopsy in 10/2020. The liver biopsy was done during laproscopic cholecystectomy on 10/27/2020, and showed chronic portal inflammation with interface activity, with bridging fibrosis. Serological work up was negative for autoimmune and viral etiologies. Recent labs show normal liver function, with a persistently elevated alkaline phosphatase (> 300); her transaminases are normal. She also had an incidental finding of hemangiomas on cross sectional imaging  "(last done in 5/2021).     She underwent MRI of the Abdomen with MRCP earlier this month for further evaluation of chronically elevated Alk Phos. Imaging showed "mild central intrahepatic biliary ductal prominence and mild dilatation of the common hepatic duct, with gradual tapering to normal caliber distal common bile duct", with no evidence of a filling defect or biliary stricture above the level of the ampulla, and no evidence of main pancreatic ductal dilatation. The radiologist recommended further evaluation with ERCP if ampullary stricture is suspected. The MRI also showed 2 small hepatic hemangiomas (1.5 and 1.2 cm, respectively) in segment VII. She does not have any new complains from liver standpoint. She is well appearing, and has no signs or symptoms of hepatic decompensation including jaundice, dark urine, abdominal distention, lower extremity edema, hematemesis, melena, or periods of confusion suggestive of hepatic encephalopathy.     Review of patient's allergies indicates:   Allergen Reactions    Sumatriptan succinate      Current Outpatient Medications on File Prior to Visit   Medication Sig Dispense Refill    AMITIZA 24 mcg Cap Take 24 mcg by mouth 2 (two) times daily.      celecoxib (CELEBREX) 200 MG capsule Take 200 mg by mouth 2 (two) times daily.      cyclobenzaprine (FLEXERIL) 10 MG tablet Take 10 mg by mouth once daily.      furosemide (LASIX) 20 MG tablet Take 20 mg by mouth once daily.      gabapentin (NEURONTIN) 600 MG tablet Take 600 mg by mouth 3 (three) times daily.      lisinopriL 10 MG tablet Take 10 mg by mouth once daily.      omeprazole (PRILOSEC) 40 MG capsule Take 40 mg by mouth once daily.      pravastatin (PRAVACHOL) 20 MG tablet Take 20 mg by mouth every evening.      traZODone (DESYREL) 50 MG tablet trazodone 50 mg tablet      zonisamide (ZONEGRAN) 25 MG Cap Take 25 mg by mouth once daily.       No current facility-administered medications on file prior to visit. "     PMHX:  has a past medical history of Advanced hepatic fibrosis, Arthritis, Chronic back pain, Fatty liver, Fibromyalgia, GERD (gastroesophageal reflux disease), and HTN (hypertension).    PSHX:  has a past surgical history that includes Laparoscopic cholecystectomy with cholangiography (10/27/2020); Liver biopsy (10/27/2020); and Total knee arthroplasty.    FAMILY HISTORY: Negative for liver disease, reviewed in EPIC    SOCIAL HISTORY:   Social History     Tobacco Use   Smoking Status Current Every Day Smoker    Packs/day: 1.00    Types: Cigarettes   Smokeless Tobacco Not on file     Social History     Substance and Sexual Activity   Alcohol Use None     Social History     Substance and Sexual Activity   Drug Use Not on file     ROS:   GENERAL: Denies fever, chills, weight loss/gain, fatigue  HEENT: Denies headaches, dizziness, vision/hearing changes  CARDIOVASCULAR: Denies chest pain, palpitations, or edema  RESPIRATORY: Denies dyspnea, cough  GI: Denies abdominal pain, rectal bleeding, nausea, vomiting. No change in bowel pattern or color  : Denies dysuria, hematuria   SKIN: Denies rash, itching   NEURO: Denies confusion, memory loss, or mood changes  PSYCH: Denies depression or anxiety  HEME/LYMPH: Denies easy bruising or bleeding    PHYSICAL EXAM:   Friendly White female, in no acute distress; alert and oriented to person, place and time.  VITALS: There were no vitals taken for this visit. (Not done - Telehealth visit).  HENT: Normocephalic, without obvious abnormality.   EYES: Sclerae anicteric.  NECK: No obvious masses.  CARDIOVASCULAR: No peripheral edema, per patient report.  RESPIRATORY: Normal respiratory effort.   GI: No abdominal distention per patient report.  EXTREMITIES: Unable to assess.  SKIN: Warm and dry. No jaundice. No rashes noted to exposed skin.  NEURO: Unable to assess.  PSYCH:  Memory intact. Thought and speech pattern appropriate. Behavior normal. No depression or anxiety  noted.    DIAGNOSTIC STUDIES:    LIVER BIOPSY 10/27/2020:        CT Abdomen With Without Contrast  Narrative: EXAMINATION:  CT ABDOMEN W WO CONTRAST    CLINICAL HISTORY:  Liver lesion, > 1cm, US nondiagnostic; Hepatic fibrosis, unspecified    TECHNIQUE:  Low dose axial images, sagittal and coronal reformations were obtained from the lung bases to the iliac crest following the IV administration of 75 mL of Omnipaque 350 and the oral administration of 500 cc Omnipaque 9.   Noncontrast, arterial, portal venous and delayed phases were acquired over the abdomen.    COMPARISON:  None    FINDINGS:  There is a bilobed structure of low attenuation in the right posterior lobe of the liver, segment 6 measuring 2.9 x 1.8 x 1.7 cm.  Following the administration of IV contrast it demonstrates nodular peripheral enhancement, and is homogeneously hyperattenuating compared to the remainder of the hepatic parenchyma on the delayed images.  This is consistent with a hemangioma.  In the extreme dome of the liver, series 5, image 12 and series 12, image 37, segment 8, there is a 7 mm liver lesion which is likely a hemangioma as well but is so small that it is difficult to characterize.    There is calcific plaque in the coronary arteries.  The visualized portions of the lung bases are clear.  There is elevation of the right hemidiaphragm.    The gallbladder is surgically absent.  The spleen, adrenal glands, pancreas are normal.    There is extensive bilateral renal cortical scarring, particularly involving the upper poles of the kidneys.  There is a 12 mm left posterior upper pole renal cyst with punctate rim calcification.  There is a 3 mm calcification at the posterior midpole of the right kidney which may be within the collecting system, with overlying cortical scarring, or in the cortex.  There is a 2.2 cm right midpole renal cyst.    The aorta is normal in caliber with extensive calcific plaque.  No significant findings are noted  in the visualized portions of the bowel.  There is no ascites in the upper abdomen.  Borderline sized peripancreatic and portacaval lymph nodes are noted with short axis diameters of 10-11 mm, series 5, images 35 and 37.  Impression: 2.9 cm right posterior lobe hepatic hemangioma.    7 mm liver lesion in segment 8 is likely a hemangioma as well but is difficult to characterize secondary to its small size.    Atherosclerosis.  Cholecystectomy.  Renal cortical scarring.    Simple and minimally complex right renal cysts.  Right renal calculus versus cortical calcification.    Electronically signed by: Gracia Bojorquez  Date:    05/07/2021  Time:    08:51    MRI ABDOMEN W/W/O CONTRAST WITH MRCP 6/8/2022:    COMPARISON:   No MR or CT comparison.     FINDINGS:     Normal liver morphology without parenchymal signal alteration on in and   opposed phase images.  Subcentimeter cyst located high within the dome   of hepatic segment VIII.  Two adjacent oval well-circumscribed T2   hyperintense masses measuring 1.5 and 1.2 cm respectively located   within hepatic segment VII each show peripheral discontinuous nodular   enhancement, similar to that of blood pool and continuous centripetal   enhancement on portal venous and delayed phases, most compatible with   hemangiomas.  Spleen and adrenal glands are normal.  No focal MR   abnormality of the pancreas.  Post cholecystectomy.     Mild central intrahepatic biliary ductal prominence and mild dilatation   of the common hepatic duct measuring up to 0.8 cm in greatest diameter.   Gradual tapering to normal caliber distal common bile duct.  No MR   evidence of filling defect or biliary stricture above the level of the   ampulla.  No evidence of main pancreatic ductal dilatation.     Normal renal enhancement without hydronephrosis.  Few small simple   renal cysts.     Nonspecific borderline in size stephanie hepatis lymph node.  No enlarged   lymph nodes within the included abdomen.   No acute vascular abnormality.     No focal MR abnormality of the stomach allowing for limitations related   to relative underdistention.  No MR evidence of acute bowel abnormality   within the included abdomen.  No evidence of free intraperitoneal fluid.    IMPRESSION:   MILD CENTRAL INTRAHEPATIC BILIARY DUCTAL PROMINENCE AND MILD DILATATION   OF THE COMMON HEPATIC DUCT WITH GRADUAL TAPERING TO NORMAL CALIBER   DISTAL COMMON BILE DUCT.  FINDINGS ARE AT LEAST IN PART THOUGHT TO BE   RELATED TO RESERVOIR EFFECT POST CHOLECYSTECTOMY.  IF AMPULLARY   STRICTURE IS SUSPECTED THEN ERCP MAY BE CONSIDERED FOR FURTHER   EVALUATION.     NO MR EVIDENCE OF CHOLEDOCHOLITHIASIS OR BILIARY STRICTURE ABOVE THE   LEVEL OF THE AMPULLA.     TWO ADJACENT SMALL HEMANGIOMAS WITHIN HEPATIC SEGMENT VII    ASSESSMENT & PLAN:  61 y.o. White female with:    1. Elevated alkaline phosphatase level  AFP Tumor Marker    Comprehensive Metabolic Panel    CBC Auto Differential    Protime-INR    Hepatitis B Core Antibody, Total    Hepatitis A antibody, IgG    Protime-INR    Hepatitis A antibody, IgG    AFP Tumor Marker    Comprehensive Metabolic Panel    CBC Auto Differential    Hepatitis B Core Antibody, Total   2. Intrahepatic bile duct dilation  AFP Tumor Marker    Comprehensive Metabolic Panel    CBC Auto Differential    Protime-INR    Hepatitis B Core Antibody, Total    Hepatitis A antibody, IgG    Protime-INR    Hepatitis A antibody, IgG    AFP Tumor Marker    Comprehensive Metabolic Panel    CBC Auto Differential    Hepatitis B Core Antibody, Total   3. Liver fibrosis  AFP Tumor Marker    Comprehensive Metabolic Panel    CBC Auto Differential    Protime-INR    Hepatitis B Core Antibody, Total    Hepatitis A antibody, IgG    Protime-INR    Hepatitis A antibody, IgG    AFP Tumor Marker    Comprehensive Metabolic Panel    CBC Auto Differential    Hepatitis B Core Antibody, Total   4. Fatty liver  AFP Tumor Marker    Comprehensive Metabolic Panel     CBC Auto Differential    Protime-INR    Hepatitis B Core Antibody, Total    Hepatitis A antibody, IgG    Protime-INR    Hepatitis A antibody, IgG    AFP Tumor Marker    Comprehensive Metabolic Panel    CBC Auto Differential    Hepatitis B Core Antibody, Total   5. Hepatic hemangioma  AFP Tumor Marker    Comprehensive Metabolic Panel    CBC Auto Differential    Protime-INR    Hepatitis B Core Antibody, Total    Hepatitis A antibody, IgG    Protime-INR    Hepatitis A antibody, IgG    AFP Tumor Marker    Comprehensive Metabolic Panel    CBC Auto Differential    Hepatitis B Core Antibody, Total   6. Need for hepatitis B vaccination     7. Encounter for follow-up surveillance of liver cancer       - Referral placed to GI ([facility]) for EUS with ERCP.   - Repeat liver function tests in 3 months.  - Will check titer level for Hepatitis A with next blood draw.  - Recommend vaccination for Hepatitis B (will arrange vaccination with [facility] if not immune to Hepatitis A).   - Avoid alcohol and herbal supplements/alternative remedies.  - Return to clinic in 3 months, sooner if needed.    Thank you for allowing me to participate in the care of Desirae Etienne       Hepatology Nurse Practitioner  [facility]  6/29/2022 @ 1600    CC'ed note to:   No ref. provider found  Primary Doctor No

## 2022-06-29 NOTE — PATIENT INSTRUCTIONS
- Referral placed to GI ([facility]) for EUS with ERCP.   - Repeat liver function tests in 3 months.  - Will check titer level for Hepatitis A with next blood draw.  - Recommend vaccination for Hepatitis B (will arrange vaccination with [facility] if not immune to Hepatitis A).   - Avoid alcohol and herbal supplements/alternative remedies.  - Return to clinic in 3 months, sooner if needed.

## 2022-06-30 ENCOUNTER — TELEPHONE (OUTPATIENT)
Dept: HEPATOLOGY | Facility: CLINIC | Age: 62
End: 2022-06-30
Payer: MEDICARE

## 2022-06-30 DIAGNOSIS — K83.8 INTRAHEPATIC BILE DUCT DILATION: Primary | ICD-10-CM

## 2022-06-30 NOTE — TELEPHONE ENCOUNTER
MA contacted patient. No answer. Left VM to let her know that I faxed external lab order to [facility] and mailed order to her address as well. I Instructed her to do labs a week before her follow up visit in 3 mos. Mailed appointment letter to patient.

## 2022-06-30 NOTE — TELEPHONE ENCOUNTER
----- Message from Neha Hall NP sent at 6/29/2022  4:00 PM CDT -----  Please arrange labs in 3 months (will need to fax external orders to [facility]), with a f/u visit with me 1 week later to review results (virtual visit okay). Thanks!

## 2022-07-06 ENCOUNTER — TELEPHONE (OUTPATIENT)
Dept: ENDOSCOPY | Facility: HOSPITAL | Age: 62
End: 2022-07-06
Payer: MEDICARE

## 2022-07-06 ENCOUNTER — PATIENT MESSAGE (OUTPATIENT)
Dept: ENDOSCOPY | Facility: HOSPITAL | Age: 62
End: 2022-07-06
Payer: MEDICARE

## 2022-07-06 NOTE — TELEPHONE ENCOUNTER
Telephoned pt to schedule EUS+/-ERCP.  Spoke with pt and procedure scheduled for 7/29/22 at 12:30pm.  Pt is fully vaccinated.  Reviewed medical history and medications.  Instructed on procedure and prep.  Patient verbalized understanding of instructions.  Copy of instructions sent via patient portal.

## 2022-07-14 ENCOUNTER — TELEPHONE (OUTPATIENT)
Dept: HEPATOLOGY | Facility: CLINIC | Age: 62
End: 2022-07-14
Payer: MEDICARE

## 2022-07-29 ENCOUNTER — ANESTHESIA EVENT (OUTPATIENT)
Dept: ENDOSCOPY | Facility: HOSPITAL | Age: 62
End: 2022-07-29
Payer: COMMERCIAL

## 2022-07-29 ENCOUNTER — ANESTHESIA (OUTPATIENT)
Dept: ENDOSCOPY | Facility: HOSPITAL | Age: 62
End: 2022-07-29
Payer: COMMERCIAL

## 2022-07-29 ENCOUNTER — HOSPITAL ENCOUNTER (OUTPATIENT)
Facility: HOSPITAL | Age: 62
Discharge: HOME | End: 2022-07-29
Attending: INTERNAL MEDICINE | Admitting: INTERNAL MEDICINE
Payer: COMMERCIAL

## 2022-07-29 ENCOUNTER — TELEPHONE (OUTPATIENT)
Dept: HEPATOLOGY | Facility: CLINIC | Age: 62
End: 2022-07-29
Payer: MEDICARE

## 2022-07-29 VITALS
OXYGEN SATURATION: 98 % | RESPIRATION RATE: 18 BRPM | SYSTOLIC BLOOD PRESSURE: 106 MMHG | DIASTOLIC BLOOD PRESSURE: 60 MMHG | HEART RATE: 71 BPM | WEIGHT: 155 LBS | TEMPERATURE: 98 F | HEIGHT: 67 IN | BODY MASS INDEX: 24.33 KG/M2

## 2022-07-29 DIAGNOSIS — R74.8 ELEVATED ALKALINE PHOSPHATASE LEVEL: Primary | ICD-10-CM

## 2022-07-29 DIAGNOSIS — K83.1 BILIARY OBSTRUCTION: ICD-10-CM

## 2022-07-29 PROCEDURE — 43259 EGD US EXAM DUODENUM/JEJUNUM: CPT | Performed by: INTERNAL MEDICINE

## 2022-07-29 PROCEDURE — 63600175 PHARM REV CODE 636 W HCPCS: Performed by: NURSE ANESTHETIST, CERTIFIED REGISTERED

## 2022-07-29 PROCEDURE — D9220A PRA ANESTHESIA: Mod: CRNA,,, | Performed by: NURSE ANESTHETIST, CERTIFIED REGISTERED

## 2022-07-29 PROCEDURE — 25000003 PHARM REV CODE 250: Performed by: NURSE ANESTHETIST, CERTIFIED REGISTERED

## 2022-07-29 PROCEDURE — 43259 EGD US EXAM DUODENUM/JEJUNUM: CPT | Mod: ,,, | Performed by: INTERNAL MEDICINE

## 2022-07-29 PROCEDURE — 37000008 HC ANESTHESIA 1ST 15 MINUTES: Performed by: INTERNAL MEDICINE

## 2022-07-29 PROCEDURE — 37000009 HC ANESTHESIA EA ADD 15 MINS: Performed by: INTERNAL MEDICINE

## 2022-07-29 PROCEDURE — D9220A PRA ANESTHESIA: ICD-10-PCS | Mod: ANES,,, | Performed by: ANESTHESIOLOGY

## 2022-07-29 PROCEDURE — 43259 PR ENDOSCOPIC ULTRASOUND EXAM: ICD-10-PCS | Mod: ,,, | Performed by: INTERNAL MEDICINE

## 2022-07-29 PROCEDURE — 25000003 PHARM REV CODE 250: Performed by: INTERNAL MEDICINE

## 2022-07-29 PROCEDURE — D9220A PRA ANESTHESIA: ICD-10-PCS | Mod: CRNA,,, | Performed by: NURSE ANESTHETIST, CERTIFIED REGISTERED

## 2022-07-29 PROCEDURE — D9220A PRA ANESTHESIA: Mod: ANES,,, | Performed by: ANESTHESIOLOGY

## 2022-07-29 RX ORDER — PROPOFOL 10 MG/ML
VIAL (ML) INTRAVENOUS
Status: DISCONTINUED | OUTPATIENT
Start: 2022-07-29 | End: 2022-07-29

## 2022-07-29 RX ORDER — SODIUM CHLORIDE 0.9 % (FLUSH) 0.9 %
10 SYRINGE (ML) INJECTION
Status: DISCONTINUED | OUTPATIENT
Start: 2022-07-29 | End: 2022-08-05

## 2022-07-29 RX ORDER — SODIUM CHLORIDE 9 MG/ML
INJECTION, SOLUTION INTRAVENOUS CONTINUOUS
Status: DISCONTINUED | OUTPATIENT
Start: 2022-07-29 | End: 2022-08-05

## 2022-07-29 RX ORDER — LIDOCAINE HYDROCHLORIDE 20 MG/ML
INJECTION, SOLUTION EPIDURAL; INFILTRATION; INTRACAUDAL; PERINEURAL
Status: DISCONTINUED | OUTPATIENT
Start: 2022-07-29 | End: 2022-07-29

## 2022-07-29 RX ORDER — PHENYLEPHRINE HCL IN 0.9% NACL 1 MG/10 ML
SYRINGE (ML) INTRAVENOUS
Status: DISCONTINUED | OUTPATIENT
Start: 2022-07-29 | End: 2022-07-29

## 2022-07-29 RX ORDER — MIDAZOLAM HYDROCHLORIDE 1 MG/ML
INJECTION, SOLUTION INTRAMUSCULAR; INTRAVENOUS
Status: DISCONTINUED | OUTPATIENT
Start: 2022-07-29 | End: 2022-07-29

## 2022-07-29 RX ORDER — PROPOFOL 10 MG/ML
VIAL (ML) INTRAVENOUS CONTINUOUS PRN
Status: DISCONTINUED | OUTPATIENT
Start: 2022-07-29 | End: 2022-07-29

## 2022-07-29 RX ORDER — FENTANYL CITRATE 50 UG/ML
INJECTION, SOLUTION INTRAMUSCULAR; INTRAVENOUS
Status: DISCONTINUED | OUTPATIENT
Start: 2022-07-29 | End: 2022-07-29

## 2022-07-29 RX ADMIN — Medication 150 MCG: at 11:07

## 2022-07-29 RX ADMIN — PROPOFOL 60 MG: 10 INJECTION, EMULSION INTRAVENOUS at 11:07

## 2022-07-29 RX ADMIN — FENTANYL CITRATE 25 MCG: 50 INJECTION INTRAMUSCULAR; INTRAVENOUS at 11:07

## 2022-07-29 RX ADMIN — Medication 100 MCG: at 11:07

## 2022-07-29 RX ADMIN — GLYCOPYRROLATE 0.2 MG: 0.2 INJECTION INTRAMUSCULAR; INTRAVENOUS at 11:07

## 2022-07-29 RX ADMIN — LIDOCAINE HYDROCHLORIDE 80 MG: 20 INJECTION, SOLUTION EPIDURAL; INFILTRATION; INTRACAUDAL; PERINEURAL at 11:07

## 2022-07-29 RX ADMIN — SODIUM CHLORIDE: 0.9 INJECTION, SOLUTION INTRAVENOUS at 11:07

## 2022-07-29 RX ADMIN — Medication 200 MCG/KG/MIN: at 11:07

## 2022-07-29 RX ADMIN — MIDAZOLAM 2 MG: 1 INJECTION INTRAMUSCULAR; INTRAVENOUS at 11:07

## 2022-07-29 NOTE — PROVATION PATIENT INSTRUCTIONS
Discharge Summary/Instructions after an Endoscopic Procedure  Patient Name: Desirae Gilmore  Patient MRN: [id number]  Patient YOB: 1960 Friday, July 29, 2022  Favian Valdes MD  Dear patient,  As a result of recent federal legislation (The Federal Cures Act), you may   receive lab or pathology results from your procedure in your [facility]   account before your physician is able to contact you. Your physician or   their representative will relay the results to you with their   recommendations at their soonest availability.  Thank you,  RESTRICTIONS:  During your procedure today, you received medications for sedation.  These   medications may affect your judgment, balance and coordination.  Therefore,   for 24 hours, you have the following restrictions:   - DO NOT drive a car, operate machinery, make legal/financial decisions,   sign important papers or drink alcohol.    ACTIVITY:  Today: no heavy lifting, straining or running due to procedural   sedation/anesthesia.  The following day: return to full activity including work.  DIET:  Eat and drink normally unless instructed otherwise.     TREATMENT FOR COMMON SIDE EFFECTS:  - Mild abdominal pain, nausea, belching, bloating or excessive gas:  rest,   eat lightly and use a heating pad.  - Sore Throat: treat with throat lozenges and/or gargle with warm salt   water.  - Because air was used during the procedure, expelling large amounts of air   from your rectum or belching is normal.  - If a bowel prep was taken, you may not have a bowel movement for 1-3 days.    This is normal.  SYMPTOMS TO WATCH FOR AND REPORT TO YOUR PHYSICIAN:  1. Abdominal pain or bloating, other than gas cramps.  2. Chest pain.  3. Back pain.  4. Signs of infection such as: chills or fever occurring within 24 hours   after the procedure.  5. Rectal bleeding, which would show as bright red, maroon, or black stools.   (A tablespoon of blood from the rectum is not serious, especially if    hemorrhoids are present.)  6. Vomiting.  7. Weakness or dizziness.  GO DIRECTLY TO THE NEAREST EMERGENCY ROOM IF YOU HAVE ANY OF THE FOLLOWING:      Difficulty breathing              Chills and/or fever over 101 F   Persistent vomiting and/or vomiting blood   Severe abdominal pain   Severe chest pain   Black, tarry stools   Bleeding- more than one tablespoon   Any other symptom or condition that you feel may need urgent attention  Your doctor recommends these additional instructions:  If any biopsies were taken, your doctors clinic will contact you in 1 to 2   weeks with any results.  - Discharge patient to home (ambulatory).   - Resume previous diet; Discharge to home (ambulatory); Resume outpatient   medications  - Return to liver clinic as previously scheduled.  For questions, problems or results please call your physician - Favian Valdes MD at Work:  [phone].  [facility], EMERGENCY ROOM PHONE NUMBER: [phone]  IF A COMPLICATION OR EMERGENCY SITUATION ARISES AND YOU ARE UNABLE TO REACH   YOUR PHYSICIAN - GO DIRECTLY TO THE EMERGENCY ROOM.  Favian Valdes MD  7/29/2022 11:33:24 AM  This report has been verified and signed electronically.  Dear patient,  As a result of recent federal legislation (The Federal Cures Act), you may   receive lab or pathology results from your procedure in your [facility]   account before your physician is able to contact you. Your physician or   their representative will relay the results to you with their   recommendations at their soonest availability.  Thank you,  PROVATION

## 2022-07-29 NOTE — TELEPHONE ENCOUNTER
----- Message from Neha Hall NP sent at 7/29/2022 11:41 AM CDT -----  Please arrange sooner f/u visit with me to review results (virtual visit okay). Thanks!

## 2022-07-29 NOTE — ANESTHESIA POSTPROCEDURE EVALUATION
Anesthesia Post Evaluation    Patient: Desirae Etienne    Procedure(s) Performed: Procedure(s) (LRB):  ULTRASOUND, UPPER GI TRACT, ENDOSCOPIC (N/A)  ERCP (ENDOSCOPIC RETROGRADE CHOLANGIOPANCREATOGRAPHY) (N/A)    Final Anesthesia Type: general      Patient location during evaluation: PACU  Patient participation: Yes- Able to Participate  Level of consciousness: awake and alert and oriented  Post-procedure vital signs: reviewed and stable  Pain management: adequate  Airway patency: patent    PONV status at discharge: No PONV  Anesthetic complications: no      Cardiovascular status: blood pressure returned to baseline and hemodynamically stable  Respiratory status: unassisted, spontaneous ventilation and room air  Hydration status: euvolemic  Follow-up not needed.          Vitals Value Taken Time   BP 93/51 07/29/22 1232   Temp 36.7 °C (98 °F) 07/29/22 1133   Pulse 73 07/29/22 1246   Resp 18 07/29/22 1215   SpO2 99 % 07/29/22 1246   Vitals shown include unvalidated device data.      No case tracking events are documented in the log.      Pain/Emma Score: Emma Score: 7 (7/29/2022 11:35 AM)

## 2022-07-29 NOTE — TRANSFER OF CARE
"Anesthesia Transfer of Care Note    Patient: Desirae Etienne    Procedure(s) Performed: Procedure(s) (LRB):  ULTRASOUND, UPPER GI TRACT, ENDOSCOPIC (N/A)  ERCP (ENDOSCOPIC RETROGRADE CHOLANGIOPANCREATOGRAPHY) (N/A)    Patient location: [facility]    Anesthesia Type: general    Transport from OR: Transported from OR on 2-3 L/min O2 by NC with adequate spontaneous ventilation    Post pain: adequate analgesia    Post assessment: no apparent anesthetic complications and tolerated procedure well    Post vital signs: stable    Level of consciousness: awake and alert    Nausea/Vomiting: no nausea/vomiting    Complications: none    Transfer of care protocol was followed      Last vitals:   Visit Vitals  BP (!) 77/53   Pulse 82   Temp 36.8 °C (98.2 °F)   Resp 16   Ht 5' 7" (1.702 m)   Wt 70.3 kg (155 lb)   SpO2 99%   Breastfeeding No   BMI 24.28 kg/m²     "

## 2022-07-29 NOTE — ANESTHESIA PREPROCEDURE EVALUATION
07/29/2022  Desirae Etienne is a 61 y.o., female.      Pre-op Assessment    I have reviewed the Patient Summary Reports.     I have reviewed the Nursing Notes. I have reviewed the NPO Status.   I have reviewed the Medications.     Review of Systems  Anesthesia Hx:  History of prior surgery of interest to airway management or planning:  Denies Personal Hx of Anesthesia complications.   Social:  Smoker    Hematology/Oncology:         -- Cancer in past history:   Cardiovascular:   Hypertension  Denies Angina.    Hepatic/GI:   GERD, well controlled Liver Disease,    Musculoskeletal:   Arthritis     Neurological:   Neuromuscular Disease,    Endocrine:  Endocrine Normal        Physical Exam  General: Well nourished, Cooperative, Alert and Oriented    Airway:  Mallampati: III   Mouth Opening: Small, but > 3cm  TM Distance: Normal      Dental:  Intact    Chest/Lungs:  Normal Respiratory Rate    Heart:  Rate: Normal        Anesthesia Plan  Type of Anesthesia, risks & benefits discussed:    Anesthesia Type: Gen Natural Airway  Intra-op Monitoring Plan: Standard ASA Monitors  Post Op Pain Control Plan: multimodal analgesia  Induction:  IV  Informed Consent: Informed consent signed with the Patient and all parties understand the risks and agree with anesthesia plan.  All questions answered.   ASA Score: 2  Day of Surgery Review of History & Physical: H&P Update referred to the surgeon/provider.    Ready For Surgery From Anesthesia Perspective.     .

## 2022-07-29 NOTE — H&P
Short Stay Endoscopy History and Physical    PCP - Primary Doctor No  Referring Physician - Favian Valdes MD  [address] [place] [address]    Procedure - EUS  ASA - per anesthesia  Mallampati - per anesthesia  History of Anesthesia problems - no  Family history Anesthesia problems -  no   Plan of anesthesia - General    HPI:  This is a 61 y.o. female here for evaluation of: dilated CBD    Reflux - no  Dysphagia - no  Abdominal pain - no  Diarrhea - no    ROS:  Constitutional: No fevers, chills, No weight loss  CV: No chest pain  Pulm: No cough, No shortness of breath  GI: see HPI    Medical History:  has a past medical history of Advanced hepatic fibrosis, Arthritis, Chronic back pain, Fatty liver, Fibromyalgia, GERD (gastroesophageal reflux disease), and HTN (hypertension).    Surgical History:  has a past surgical history that includes Laparoscopic cholecystectomy with cholangiography (10/27/2020); Liver biopsy (10/27/2020); and Total knee arthroplasty.    Family History: family history is not on file..    Social History:  reports that she has been smoking cigarettes. She has been smoking about 1.00 pack per day. She does not have any smokeless tobacco history on file.    Review of patient's allergies indicates:   Allergen Reactions    Sumatriptan succinate        Medications:   Medications Prior to Admission   Medication Sig Dispense Refill Last Dose    AMITIZA 24 mcg Cap Take 24 mcg by mouth 2 (two) times daily.       celecoxib (CELEBREX) 200 MG capsule Take 200 mg by mouth 2 (two) times daily.       cyclobenzaprine (FLEXERIL) 10 MG tablet Take 10 mg by mouth once daily.       furosemide (LASIX) 20 MG tablet Take 20 mg by mouth once daily.       gabapentin (NEURONTIN) 600 MG tablet Take 600 mg by mouth 3 (three) times daily.       lisinopriL 10 MG tablet Take 10 mg by mouth once daily.       omeprazole (PRILOSEC) 40 MG capsule Take 40 mg by mouth once daily.       pravastatin  (PRAVACHOL) 20 MG tablet Take 20 mg by mouth every evening.       traZODone (DESYREL) 50 MG tablet trazodone 50 mg tablet       zonisamide (ZONEGRAN) 25 MG Cap Take 25 mg by mouth once daily.          Physical Exam:    Vital Signs: There were no vitals filed for this visit.    General Appearance: Well appearing in no acute distress  Eyes:    No scleral icterus  Lungs: no labored breathing  Heart:  Regular  Abdomen: non tender    Labs:  Lab Results   Component Value Date    WBC 10.2 07/19/2022    HGB 12.4 07/19/2022    HCT 37.6 07/19/2022     07/19/2022    ALT 31 06/28/2022    AST 35 06/28/2022     07/19/2022    K 4.8 07/19/2022     07/19/2022    CREATININE 0.88 07/19/2022    BUN 16 07/19/2022    CO2 26 07/19/2022    INR 0.80 06/28/2022       I have explained the risks and benefits of this endoscopic procedure to the patient including but not limited to bleeding, inflammation, infection, perforation, and death.      Favian Valdes MD

## 2022-08-05 ENCOUNTER — OFFICE VISIT (OUTPATIENT)
Dept: HEPATOLOGY | Facility: CLINIC | Age: 62
End: 2022-08-05
Payer: COMMERCIAL

## 2022-08-05 ENCOUNTER — PATIENT MESSAGE (OUTPATIENT)
Dept: HEPATOLOGY | Facility: CLINIC | Age: 62
End: 2022-08-05

## 2022-08-05 DIAGNOSIS — R74.8 ELEVATED ALKALINE PHOSPHATASE LEVEL: Primary | ICD-10-CM

## 2022-08-05 DIAGNOSIS — K74.00 LIVER FIBROSIS: ICD-10-CM

## 2022-08-05 DIAGNOSIS — D18.03 HEPATIC HEMANGIOMA: ICD-10-CM

## 2022-08-05 DIAGNOSIS — K83.8 INTRAHEPATIC BILE DUCT DILATION: ICD-10-CM

## 2022-08-05 DIAGNOSIS — K76.0 FATTY LIVER: ICD-10-CM

## 2022-08-05 PROCEDURE — 99214 PR OFFICE/OUTPT VISIT, EST, LEVL IV, 30-39 MIN: ICD-10-PCS | Mod: 95,,, | Performed by: NURSE PRACTITIONER

## 2022-08-05 PROCEDURE — 1159F MED LIST DOCD IN RCRD: CPT | Mod: CPTII,95,, | Performed by: NURSE PRACTITIONER

## 2022-08-05 PROCEDURE — 1159F PR MEDICATION LIST DOCUMENTED IN MEDICAL RECORD: ICD-10-PCS | Mod: CPTII,95,, | Performed by: NURSE PRACTITIONER

## 2022-08-05 PROCEDURE — 4010F ACE/ARB THERAPY RXD/TAKEN: CPT | Mod: CPTII,95,, | Performed by: NURSE PRACTITIONER

## 2022-08-05 PROCEDURE — 4010F PR ACE/ARB THEARPY RXD/TAKEN: ICD-10-PCS | Mod: CPTII,95,, | Performed by: NURSE PRACTITIONER

## 2022-08-05 PROCEDURE — 1160F RVW MEDS BY RX/DR IN RCRD: CPT | Mod: CPTII,95,, | Performed by: NURSE PRACTITIONER

## 2022-08-05 PROCEDURE — 99214 OFFICE O/P EST MOD 30 MIN: CPT | Mod: 95,,, | Performed by: NURSE PRACTITIONER

## 2022-08-05 PROCEDURE — 1160F PR REVIEW ALL MEDS BY PRESCRIBER/CLIN PHARMACIST DOCUMENTED: ICD-10-PCS | Mod: CPTII,95,, | Performed by: NURSE PRACTITIONER

## 2022-08-05 RX ORDER — CARBAMAZEPINE 100 MG/1
100 CAPSULE, EXTENDED RELEASE ORAL 2 TIMES DAILY
COMMUNITY

## 2022-08-05 RX ORDER — ATORVASTATIN CALCIUM 20 MG/1
20 TABLET, FILM COATED ORAL DAILY
COMMUNITY

## 2022-08-05 NOTE — PATIENT INSTRUCTIONS
- Will discuss lab, MRCP and EUS findings with attending Hepatologist to determine next steps in plan of care, likely liver biopsy.   - Repeat liver function tests in 1 week.  - Will check titer level for Hepatitis A with next blood draw.  - Recommend vaccination for Hepatitis B (will arrange vaccination with [facility] if not immune to Hepatitis A).   - Avoid alcohol and herbal supplements/alternative remedies.  - Return to clinic to be determined.

## 2022-08-05 NOTE — PROGRESS NOTES
The patient location is: Home ([place], [place])  The chief complaint leading to consultation is: Abnormal diagnostic imaging/elevated alk phos    Visit type: audiovisual     Face to Face time with patient: 15  30 minutes of total time spent on the encounter, which includes face to face time and non-face to face time preparing to see the patient (eg, review of tests), Obtaining and/or reviewing separately obtained history, Documenting clinical information in the electronic or other health record, Independently interpreting results (not separately reported) and communicating results to the patient/family/caregiver, or Care coordination (not separately reported).     Each patient to whom he or she provides medical services by telemedicine is:  (1) informed of the relationship between the physician and patient and the respective role of any other health care provider with respect to management of the patient; and (2) notified that he or she may decline to receive medical services by telemedicine and may withdraw from such care at any time.    Notes:       [facility] VISIT ESTABLISHED PT NOTE    REFERRING PROVIDER:  No ref. provider found    CHIEF COMPLAINT: Abnormal diagnostic imaging/elevated alk phos    HPI: This is a 61 y.o. White female with PMH noted below, presenting for follow up. She was last seen in clinic by myself for a virtual visit on 6/29/2022.     She has a history of fatty liver with stage 3 fibrosis on liver biopsy in 10/2020. The liver biopsy was done during laproscopic cholecystectomy on 10/27/2020, and showed chronic portal inflammation with interface activity, with bridging fibrosis. Serological work up was negative for autoimmune and viral etiologies. Labs continue to show normal liver function, with a persistently elevated alkaline phosphatase (> 400); her transaminases are normal. She also had an incidental finding of hemangiomas on cross sectional imaging.    She underwent MRI of the  "Abdomen with MRCP in June for further evaluation of chronically elevated Alk Phos. Imaging showed "mild central intrahepatic biliary ductal prominence and mild dilatation of the common hepatic duct, with gradual tapering to normal caliber distal common bile duct", with no evidence of a filling defect or biliary stricture above the level of the ampulla, and no evidence of main pancreatic ductal dilatation. The radiologist recommended further evaluation with ERCP if ampullary stricture is suspected. The MRI also showed 2 small hepatic hemangiomas (1.5 and 1.2 cm, respectively) in segment VII. EUS was done in late July which showed no gallstones, bile duct strictures or significant pathology in the CBD or pancreatic duct. She does not have any new complains from liver standpoint. She is well appearing, and has no signs or symptoms of hepatic decompensation including jaundice, dark urine, abdominal distention, lower extremity edema, hematemesis, melena, or periods of confusion suggestive of hepatic encephalopathy.     Review of patient's allergies indicates:  No Known Allergies  Current Outpatient Medications on File Prior to Visit   Medication Sig Dispense Refill    atorvastatin (LIPITOR) 20 MG tablet Take 20 mg by mouth once daily.      carBAMazepine (CARBATROL) 100 MG CM12 Take 100 mg by mouth 2 (two) times a day.      celecoxib (CELEBREX) 200 MG capsule Take 200 mg by mouth 2 (two) times daily.      cyclobenzaprine (FLEXERIL) 10 MG tablet Take 10 mg by mouth once daily.      furosemide (LASIX) 20 MG tablet Take 20 mg by mouth once daily.      lisinopriL 10 MG tablet Take 10 mg by mouth once daily.      omeprazole (PRILOSEC) 40 MG capsule Take 40 mg by mouth once daily.      traZODone (DESYREL) 50 MG tablet trazodone 50 mg tablet      zonisamide (ZONEGRAN) 25 MG Cap Take 25 mg by mouth once daily.      [DISCONTINUED] AMITIZA 24 mcg Cap Take 24 mcg by mouth 2 (two) times daily.      [DISCONTINUED] " gabapentin (NEURONTIN) 600 MG tablet Take 600 mg by mouth 3 (three) times daily.      [DISCONTINUED] pravastatin (PRAVACHOL) 20 MG tablet Take 20 mg by mouth every evening.       Current Facility-Administered Medications on File Prior to Visit   Medication Dose Route Frequency Provider Last Rate Last Admin    [DISCONTINUED] 0.9%  NaCl infusion   Intravenous Continuous Favian Valdes MD   Stopped at 07/29/22 1128    [DISCONTINUED] sodium chloride 0.9% flush 10 mL  10 mL Intravenous PRN Favian Valdes MD         PMHX:  has a past medical history of Advanced hepatic fibrosis, Arthritis, Chronic back pain, Fatty liver, Fibromyalgia, GERD (gastroesophageal reflux disease), and HTN (hypertension).    PSHX:  has a past surgical history that includes Laparoscopic cholecystectomy with cholangiography (10/27/2020); Liver biopsy (10/27/2020); Total knee arthroplasty; Endoscopic ultrasound of upper gastrointestinal tract (N/A, 7/29/2022); and ERCP (N/A, 7/29/2022).    FAMILY HISTORY: Negative for liver disease, reviewed in [facility]    SOCIAL HISTORY:   Social History     Tobacco Use   Smoking Status Current Every Day Smoker    Packs/day: 1.00    Types: Cigarettes   Smokeless Tobacco Not on file     Social History     Substance and Sexual Activity   Alcohol Use None     Social History     Substance and Sexual Activity   Drug Use Not on file     ROS:   GENERAL: Denies fever, chills, weight loss/gain, fatigue  HEENT: Denies headaches, dizziness, vision/hearing changes  CARDIOVASCULAR: Denies chest pain, palpitations, or edema  RESPIRATORY: Denies dyspnea, cough  GI: Denies abdominal pain, rectal bleeding, nausea, vomiting. No change in bowel pattern or color  : Denies dysuria, hematuria   SKIN: Denies rash, itching   NEURO: Denies confusion, memory loss, or mood changes  PSYCH: Denies depression or anxiety  HEME/LYMPH: Denies easy bruising or bleeding    PHYSICAL EXAM:   Friendly White female, in no acute distress; alert and  oriented to person, place and time.  VITALS: There were no vitals taken for this visit. (Not done - Telehealth visit).  HENT: Normocephalic, without obvious abnormality.   EYES: Sclerae anicteric.  NECK: No obvious masses.  CARDIOVASCULAR: No peripheral edema, per patient report.  RESPIRATORY: Normal respiratory effort.   GI: No abdominal distention per patient report.  EXTREMITIES: Unable to assess.  SKIN: Warm and dry. No jaundice. No rashes noted to exposed skin.  NEURO: Unable to assess.  PSYCH:  Memory intact. Thought and speech pattern appropriate. Behavior normal. No depression or anxiety noted.    DIAGNOSTIC STUDIES:    LIVER BIOPSY 10/27/2020:        MRI ABDOMEN W/W/O CONTRAST WITH MRCP 6/8/2022:    COMPARISON:   No MR or CT comparison.     FINDINGS:     Normal liver morphology without parenchymal signal alteration on in and   opposed phase images.  Subcentimeter cyst located high within the dome   of hepatic segment VIII.  Two adjacent oval well-circumscribed T2   hyperintense masses measuring 1.5 and 1.2 cm respectively located   within hepatic segment VII each show peripheral discontinuous nodular   enhancement, similar to that of blood pool and continuous centripetal   enhancement on portal venous and delayed phases, most compatible with   hemangiomas.  Spleen and adrenal glands are normal.  No focal MR   abnormality of the pancreas.  Post cholecystectomy.     Mild central intrahepatic biliary ductal prominence and mild dilatation   of the common hepatic duct measuring up to 0.8 cm in greatest diameter.   Gradual tapering to normal caliber distal common bile duct.  No MR   evidence of filling defect or biliary stricture above the level of the   ampulla.  No evidence of main pancreatic ductal dilatation.     Normal renal enhancement without hydronephrosis.  Few small simple   renal cysts.     Nonspecific borderline in size stephanie hepatis lymph node.  No enlarged   lymph nodes within the included abdomen.   No acute vascular abnormality.     No focal MR abnormality of the stomach allowing for limitations related   to relative underdistention.  No MR evidence of acute bowel abnormality   within the included abdomen.  No evidence of free intraperitoneal fluid.    IMPRESSION:   MILD CENTRAL INTRAHEPATIC BILIARY DUCTAL PROMINENCE AND MILD DILATATION   OF THE COMMON HEPATIC DUCT WITH GRADUAL TAPERING TO NORMAL CALIBER   DISTAL COMMON BILE DUCT.  FINDINGS ARE AT LEAST IN PART THOUGHT TO BE   RELATED TO RESERVOIR EFFECT POST CHOLECYSTECTOMY.  IF AMPULLARY   STRICTURE IS SUSPECTED THEN ERCP MAY BE CONSIDERED FOR FURTHER   EVALUATION.     NO MR EVIDENCE OF CHOLEDOCHOLITHIASIS OR BILIARY STRICTURE ABOVE THE   LEVEL OF THE AMPULLA.     TWO ADJACENT SMALL HEMANGIOMAS WITHIN HEPATIC SEGMENT VII.    EUS 7/29/2022:    Findings:        ENDOSONOGRAPHIC FINDING:        The esophagus, stomach and duodenum and adjacent structures were        visualized endosonographically.        Endosonographic imaging in the visualized portion of the liver        showed no lesion.        There was no sign of significant endosonographic abnormality in the        pancreatic head.        There was no sign of significant endosonographic abnormality in the        common bile duct. The maximum diameter of the duct was 6 mm. No        stones, no biliary sludge, ducts of normal caliber and ducts with        regular contour were identified.   Impression:    - There was no sign of significant pathology in                          the pancreatic head.                          - There was no sign of significant pathology in                          the common bile duct.                          - No EUS evidence for biliary obstruction.                          - No specimens collected.     ASSESSMENT & PLAN:  61 y.o. White female with:    1. Elevated alkaline phosphatase level  Angiotensin Converting Enzyme    Hepatic Function Panel    Cancer Antigen 19-9     Antimitochondrial Antibody    Angiotensin Converting Enzyme    Hepatic Function Panel    Cancer Antigen 19-9    Antimitochondrial Antibody    Hepatitis A antibody, IgG    Hepatitis A antibody, IgG    Alkaline Phosphatase, Isoenzymes    Alkaline Phosphatase, Isoenzymes   2. Intrahepatic bile duct dilation  Angiotensin Converting Enzyme    Hepatic Function Panel    Cancer Antigen 19-9    Antimitochondrial Antibody    Angiotensin Converting Enzyme    Hepatic Function Panel    Cancer Antigen 19-9    Antimitochondrial Antibody    Hepatitis A antibody, IgG    Hepatitis A antibody, IgG    Alkaline Phosphatase, Isoenzymes    Alkaline Phosphatase, Isoenzymes   3. Liver fibrosis  Angiotensin Converting Enzyme    Hepatic Function Panel    Cancer Antigen 19-9    Antimitochondrial Antibody    Angiotensin Converting Enzyme    Hepatic Function Panel    Cancer Antigen 19-9    Antimitochondrial Antibody    Hepatitis A antibody, IgG    Hepatitis A antibody, IgG    Alkaline Phosphatase, Isoenzymes    Alkaline Phosphatase, Isoenzymes   4. Fatty liver  Angiotensin Converting Enzyme    Hepatic Function Panel    Cancer Antigen 19-9    Antimitochondrial Antibody    Angiotensin Converting Enzyme    Hepatic Function Panel    Cancer Antigen 19-9    Antimitochondrial Antibody    Hepatitis A antibody, IgG    Hepatitis A antibody, IgG    Alkaline Phosphatase, Isoenzymes    Alkaline Phosphatase, Isoenzymes   5. Hepatic hemangioma  Angiotensin Converting Enzyme    Hepatic Function Panel    Cancer Antigen 19-9    Antimitochondrial Antibody    Angiotensin Converting Enzyme    Hepatic Function Panel    Cancer Antigen 19-9    Antimitochondrial Antibody    Hepatitis A antibody, IgG    Hepatitis A antibody, IgG     - Will discuss lab, MRCP and EUS findings with attending Hepatologist to determine next steps in plan of care, likely liver biopsy.   - Repeat liver function tests in 1 week.  - Will check titer level for Hepatitis A with next blood draw.  -  Recommend vaccination for Hepatitis B (will arrange vaccination with [facility] if not immune to Hepatitis A).   - Avoid alcohol and herbal supplements/alternative remedies.  - Return to clinic to be determined.     Thank you for allowing me to participate in the care of Desirae Etienne       Hepatology Nurse Practitioner  [facility]  8/5/2022 @ 1600    CC'ed note to:   No ref. provider found  Primary Doctor No

## 2022-08-09 ENCOUNTER — TELEPHONE (OUTPATIENT)
Dept: HEPATOLOGY | Facility: CLINIC | Age: 62
End: 2022-08-09
Payer: MEDICARE

## 2022-08-09 NOTE — TELEPHONE ENCOUNTER
Called patient to let her know that I faxed lab order to [facility]. Fax number     Tammy Solomon Staff  Caller: Unspecified (Today, 12:41 PM)  Patient calling to get lab orders sent to [facility] in [place]       Call: [phone] (Mobile)

## 2022-08-25 ENCOUNTER — TELEPHONE (OUTPATIENT)
Dept: HEPATOLOGY | Facility: CLINIC | Age: 62
End: 2022-08-25
Payer: MEDICARE

## 2022-08-25 ENCOUNTER — PATIENT MESSAGE (OUTPATIENT)
Dept: HEPATOLOGY | Facility: CLINIC | Age: 62
End: 2022-08-25
Payer: MEDICARE

## 2022-08-25 NOTE — TELEPHONE ENCOUNTER
Chart review completed.       Hepatology Nurse Practitioner  [facility]

## 2022-08-26 ENCOUNTER — OFFICE VISIT (OUTPATIENT)
Dept: HEPATOLOGY | Facility: CLINIC | Age: 62
End: 2022-08-26
Payer: COMMERCIAL

## 2022-08-26 DIAGNOSIS — K83.8 INTRAHEPATIC BILE DUCT DILATION: ICD-10-CM

## 2022-08-26 DIAGNOSIS — K74.00 LIVER FIBROSIS: ICD-10-CM

## 2022-08-26 DIAGNOSIS — K74.3 PRIMARY BILIARY CHOLANGITIS: Primary | ICD-10-CM

## 2022-08-26 DIAGNOSIS — Z23 NEED FOR HEPATITIS A AND B VACCINATION: ICD-10-CM

## 2022-08-26 DIAGNOSIS — K76.0 FATTY LIVER: ICD-10-CM

## 2022-08-26 PROCEDURE — 1160F RVW MEDS BY RX/DR IN RCRD: CPT | Mod: CPTII,95,, | Performed by: NURSE PRACTITIONER

## 2022-08-26 PROCEDURE — 4010F ACE/ARB THERAPY RXD/TAKEN: CPT | Mod: CPTII,95,, | Performed by: NURSE PRACTITIONER

## 2022-08-26 PROCEDURE — 1159F PR MEDICATION LIST DOCUMENTED IN MEDICAL RECORD: ICD-10-PCS | Mod: CPTII,95,, | Performed by: NURSE PRACTITIONER

## 2022-08-26 PROCEDURE — 1159F MED LIST DOCD IN RCRD: CPT | Mod: CPTII,95,, | Performed by: NURSE PRACTITIONER

## 2022-08-26 PROCEDURE — 4010F PR ACE/ARB THEARPY RXD/TAKEN: ICD-10-PCS | Mod: CPTII,95,, | Performed by: NURSE PRACTITIONER

## 2022-08-26 PROCEDURE — 99214 OFFICE O/P EST MOD 30 MIN: CPT | Mod: 95,,, | Performed by: NURSE PRACTITIONER

## 2022-08-26 PROCEDURE — 99214 PR OFFICE/OUTPT VISIT, EST, LEVL IV, 30-39 MIN: ICD-10-PCS | Mod: 95,,, | Performed by: NURSE PRACTITIONER

## 2022-08-26 PROCEDURE — 1160F PR REVIEW ALL MEDS BY PRESCRIBER/CLIN PHARMACIST DOCUMENTED: ICD-10-PCS | Mod: CPTII,95,, | Performed by: NURSE PRACTITIONER

## 2022-08-26 RX ORDER — URSODIOL 500 MG/1
500 TABLET, FILM COATED ORAL 2 TIMES DAILY
Qty: 60 TABLET | Refills: 11 | Status: SHIPPED | OUTPATIENT
Start: 2022-08-26 | End: 2023-07-13 | Stop reason: SDUPTHER

## 2022-08-26 NOTE — PROGRESS NOTES
The patient location is: Home ([place], [place])  The chief complaint leading to consultation is: Abnormal diagnostic imaging/elevated alk phos    Visit type: audiovisual     Face to Face time with patient: 20  30 minutes of total time spent on the encounter, which includes face to face time and non-face to face time preparing to see the patient (eg, review of tests), Obtaining and/or reviewing separately obtained history, Documenting clinical information in the electronic or other health record, Independently interpreting results (not separately reported) and communicating results to the patient/family/caregiver, or Care coordination (not separately reported).     Each patient to whom he or she provides medical services by telemedicine is:  (1) informed of the relationship between the physician and patient and the respective role of any other health care provider with respect to management of the patient; and (2) notified that he or she may decline to receive medical services by telemedicine and may withdraw from such care at any time.    Notes:       [facility] VISIT ESTABLISHED PT NOTE    REFERRING PROVIDER:  No ref. provider found    CHIEF COMPLAINT: Abnormal diagnostic imaging/elevated alk phos    HPI: This is a 61 y.o. White female with PMH noted below, presenting for follow up. She was last seen in clinic by myself for a virtual visit on 8/5/2022.    She has a history of fatty liver with stage 3 fibrosis on liver biopsy in 10/2020. The liver biopsy was done during laproscopic cholecystectomy on 10/27/2020, and showed chronic portal inflammation with interface activity, with bridging fibrosis. Serological work up was negative for autoimmune and viral etiologies. Labs continue to show normal liver function, with a persistently elevated alkaline phosphatase (> 400); her transaminases are normal. She also had an incidental finding of hemangiomas on cross sectional imaging.    She underwent MRI of the  "Abdomen with MRCP in June for further evaluation of chronically elevated Alk Phos. Imaging showed "mild central intrahepatic biliary ductal prominence and mild dilatation of the common hepatic duct, with gradual tapering to normal caliber distal common bile duct", with no evidence of a filling defect or biliary stricture above the level of the ampulla, and no evidence of main pancreatic ductal dilatation. The radiologist recommended further evaluation with ERCP if ampullary stricture is suspected. The MRI also showed 2 small hepatic hemangiomas (1.5 and 1.2 cm, respectively) in segment VII. EUS was done in late July which showed no gallstones, bile duct strictures or significant pathology in the CBD or pancreatic duct.     She does not have any new complains from liver standpoint. Repeat labs on 8/9 showed an ALP of 760, with normal transaminases and intact synthetic function. ACE level normal. CA 19-9 and AFP were also normal. AMA is now positive (127.2), consistent with diagnosis of PBC. Labs also showed that she has not been exposed to, and is not immune to Hepatitis A or B.    She is well appearing, and has no signs or symptoms of hepatic decompensation including jaundice, dark urine, abdominal distention, lower extremity edema, hematemesis, melena, or periods of confusion suggestive of hepatic encephalopathy.     Review of patient's allergies indicates:  No Known Allergies  Current Outpatient Medications on File Prior to Visit   Medication Sig Dispense Refill    atorvastatin (LIPITOR) 20 MG tablet Take 20 mg by mouth once daily.      carBAMazepine (CARBATROL) 100 MG CM12 Take 100 mg by mouth 2 (two) times a day.      celecoxib (CELEBREX) 200 MG capsule Take 200 mg by mouth 2 (two) times daily.      cyclobenzaprine (FLEXERIL) 10 MG tablet Take 10 mg by mouth once daily.      furosemide (LASIX) 20 MG tablet Take 20 mg by mouth once daily.      lisinopriL 10 MG tablet Take 10 mg by mouth once daily.      " omeprazole (PRILOSEC) 40 MG capsule Take 40 mg by mouth once daily.      traZODone (DESYREL) 50 MG tablet trazodone 50 mg tablet      zonisamide (ZONEGRAN) 25 MG Cap Take 25 mg by mouth once daily.       No current facility-administered medications on file prior to visit.     PMHX:  has a past medical history of Advanced hepatic fibrosis, Arthritis, Chronic back pain, Fatty liver, Fibromyalgia, GERD (gastroesophageal reflux disease), and HTN (hypertension).    PSHX:  has a past surgical history that includes Laparoscopic cholecystectomy with cholangiography (10/27/2020); Liver biopsy (10/27/2020); Total knee arthroplasty; Endoscopic ultrasound of upper gastrointestinal tract (N/A, 7/29/2022); and ERCP (N/A, 7/29/2022).    FAMILY HISTORY: Negative for liver disease, reviewed in [facility]    SOCIAL HISTORY:   Social History     Tobacco Use   Smoking Status Current Every Day Smoker    Packs/day: 1.00    Types: Cigarettes   Smokeless Tobacco Not on file     Social History     Substance and Sexual Activity   Alcohol Use None     Social History     Substance and Sexual Activity   Drug Use Not on file     ROS:   GENERAL: Denies fever, chills, weight loss/gain, fatigue  HEENT: Denies headaches, dizziness, vision/hearing changes  CARDIOVASCULAR: Denies chest pain, palpitations, or edema  RESPIRATORY: Denies dyspnea, cough  GI: Denies abdominal pain, rectal bleeding, nausea, vomiting. No change in bowel pattern or color  : Denies dysuria, hematuria   SKIN: Denies rash, itching   NEURO: Denies confusion, memory loss, or mood changes  PSYCH: Denies depression or anxiety  HEME/LYMPH: Denies easy bruising or bleeding    PHYSICAL EXAM:   Friendly White female, in no acute distress; alert and oriented to person, place and time.  VITALS: There were no vitals taken for this visit. (Not done - Telehealth visit).  HENT: Normocephalic, without obvious abnormality.   EYES: Sclerae anicteric.  NECK: No obvious masses.  CARDIOVASCULAR: No  peripheral edema, per patient report.  RESPIRATORY: Normal respiratory effort.   GI: No abdominal distention per patient report.  EXTREMITIES: Unable to assess.  SKIN: Warm and dry. No jaundice. No rashes noted to exposed skin.  NEURO: Unable to assess.  PSYCH:  Memory intact. Thought and speech pattern appropriate. Behavior normal. No depression or anxiety noted.    DIAGNOSTIC STUDIES:    LIVER BIOPSY 10/27/2020:        MRI ABDOMEN W/W/O CONTRAST WITH MRCP 6/8/2022:    COMPARISON:   No MR or CT comparison.     FINDINGS:     Normal liver morphology without parenchymal signal alteration on in and   opposed phase images.  Subcentimeter cyst located high within the dome   of hepatic segment VIII.  Two adjacent oval well-circumscribed T2   hyperintense masses measuring 1.5 and 1.2 cm respectively located   within hepatic segment VII each show peripheral discontinuous nodular   enhancement, similar to that of blood pool and continuous centripetal   enhancement on portal venous and delayed phases, most compatible with   hemangiomas.  Spleen and adrenal glands are normal.  No focal MR   abnormality of the pancreas.  Post cholecystectomy.     Mild central intrahepatic biliary ductal prominence and mild dilatation   of the common hepatic duct measuring up to 0.8 cm in greatest diameter.   Gradual tapering to normal caliber distal common bile duct.  No MR   evidence of filling defect or biliary stricture above the level of the   ampulla.  No evidence of main pancreatic ductal dilatation.     Normal renal enhancement without hydronephrosis.  Few small simple   renal cysts.     Nonspecific borderline in size stephanie hepatis lymph node.  No enlarged   lymph nodes within the included abdomen.  No acute vascular abnormality.     No focal MR abnormality of the stomach allowing for limitations related   to relative underdistention.  No MR evidence of acute bowel abnormality   within the included abdomen.  No evidence of free  intraperitoneal fluid.    IMPRESSION:   MILD CENTRAL INTRAHEPATIC BILIARY DUCTAL PROMINENCE AND MILD DILATATION   OF THE COMMON HEPATIC DUCT WITH GRADUAL TAPERING TO NORMAL CALIBER   DISTAL COMMON BILE DUCT.  FINDINGS ARE AT LEAST IN PART THOUGHT TO BE   RELATED TO RESERVOIR EFFECT POST CHOLECYSTECTOMY.  IF AMPULLARY   STRICTURE IS SUSPECTED THEN ERCP MAY BE CONSIDERED FOR FURTHER   EVALUATION.     NO MR EVIDENCE OF CHOLEDOCHOLITHIASIS OR BILIARY STRICTURE ABOVE THE   LEVEL OF THE AMPULLA.     TWO ADJACENT SMALL HEMANGIOMAS WITHIN HEPATIC SEGMENT VII.    EUS 7/29/2022:    Findings:        ENDOSONOGRAPHIC FINDING:        The esophagus, stomach and duodenum and adjacent structures were        visualized endosonographically.        Endosonographic imaging in the visualized portion of the liver        showed no lesion.        There was no sign of significant endosonographic abnormality in the        pancreatic head.        There was no sign of significant endosonographic abnormality in the        common bile duct. The maximum diameter of the duct was 6 mm. No        stones, no biliary sludge, ducts of normal caliber and ducts with        regular contour were identified.   Impression:    - There was no sign of significant pathology in                          the pancreatic head.                          - There was no sign of significant pathology in                          the common bile duct.                          - No EUS evidence for biliary obstruction.                          - No specimens collected.     ASSESSMENT & PLAN:  61 y.o. White female with:    1. Primary biliary cholangitis  ursodioL (ACTIGALL) 500 MG tablet    CBC Auto Differential    Comprehensive Metabolic Panel    Protime-INR   2. Liver fibrosis  ursodioL (ACTIGALL) 500 MG tablet    CBC Auto Differential    Comprehensive Metabolic Panel    Protime-INR   3. Intrahepatic bile duct dilation  CBC Auto Differential    Comprehensive Metabolic Panel     Protime-INR   4. Fatty liver  CBC Auto Differential    Comprehensive Metabolic Panel    Protime-INR   5. Need for hepatitis A and B vaccination  hepatitis A and B vaccine, PF, (TWINRIX) 720 CHYNA unit- 20 mcg/mL Syrg suspension     - Start Ursodiol 500 mg PO BID for the treatment of PBC.  - Repeat liver function tests in 1 month after initiation of treatment to assess response.  - Recommend abdominal ultrasound, with AFP measurement for HCC surveillance every 6 months, next due 12/2022.  - Recommend vaccination for Hepatitis A & B (RX for Twinrix sent to local pharmacy).  - Maintain a healthy weight, through diet and exercise.  - Recommend good control of blood pressure, blood sugar and lipids.   - Avoid alcohol and herbal supplements/alternative remedies.  - Return to clinic in 1 month with labs drawn prior to visit.     Thank you for allowing me to participate in the care of Desirae Etienne       Hepatology Nurse Practitioner  [facility]  8/26/2022 @ [id number]    CC'ed note to:   No ref. provider found  Janice Tadeo NP

## 2022-08-26 NOTE — PATIENT INSTRUCTIONS
- Start Ursodiol 500 mg PO BID for the treatment of PBC.  - Repeat liver function tests in 1 month after initiation of treatment to assess response.  - Recommend abdominal ultrasound, with AFP measurement for HCC surveillance every 6 months, next due 12/2022.  - Recommend vaccination for Hepatitis A & B (RX for Twinrix sent to local pharmacy).  - Maintain a healthy weight, through diet and exercise.  - Recommend good control of blood pressure, blood sugar and lipids.   - Avoid alcohol and herbal supplements/alternative remedies.  - Return to clinic in 1 month with labs drawn prior to visit.

## 2022-08-29 ENCOUNTER — TELEPHONE (OUTPATIENT)
Dept: HEPATOLOGY | Facility: CLINIC | Age: 62
End: 2022-08-29
Payer: MEDICARE

## 2022-08-29 NOTE — TELEPHONE ENCOUNTER
----- Message from Neha Hall NP sent at 8/26/2022  2:40 PM CDT -----  Please fax external lab orders to [facility] in [place], and contact patient to arrange a f/u visit with me in 5 weeks (virtual visit okay). Thank you!

## 2022-08-31 ENCOUNTER — PATIENT MESSAGE (OUTPATIENT)
Dept: HEPATOLOGY | Facility: CLINIC | Age: 62
End: 2022-08-31
Payer: MEDICARE

## 2022-09-11 ENCOUNTER — PATIENT MESSAGE (OUTPATIENT)
Dept: HEPATOLOGY | Facility: CLINIC | Age: 62
End: 2022-09-11
Payer: MEDICARE

## 2022-10-03 PROBLEM — Z85.05 ENCOUNTER FOR FOLLOW-UP SURVEILLANCE OF LIVER CANCER: Status: RESOLVED | Noted: 2022-06-29 | Resolved: 2022-10-03

## 2022-10-03 PROBLEM — Z08 ENCOUNTER FOR FOLLOW-UP SURVEILLANCE OF LIVER CANCER: Status: RESOLVED | Noted: 2022-06-29 | Resolved: 2022-10-03

## 2022-10-05 ENCOUNTER — OFFICE VISIT (OUTPATIENT)
Dept: HEPATOLOGY | Facility: CLINIC | Age: 62
End: 2022-10-05
Payer: COMMERCIAL

## 2022-10-05 DIAGNOSIS — K74.00 LIVER FIBROSIS: ICD-10-CM

## 2022-10-05 DIAGNOSIS — K74.3 PRIMARY BILIARY CHOLANGITIS: Primary | ICD-10-CM

## 2022-10-05 DIAGNOSIS — D18.03 HEPATIC HEMANGIOMA: ICD-10-CM

## 2022-10-05 DIAGNOSIS — K83.8 INTRAHEPATIC BILE DUCT DILATION: ICD-10-CM

## 2022-10-05 DIAGNOSIS — K76.0 FATTY LIVER: ICD-10-CM

## 2022-10-05 PROCEDURE — 1160F RVW MEDS BY RX/DR IN RCRD: CPT | Mod: CPTII,95,, | Performed by: NURSE PRACTITIONER

## 2022-10-05 PROCEDURE — 99214 OFFICE O/P EST MOD 30 MIN: CPT | Mod: 95,,, | Performed by: NURSE PRACTITIONER

## 2022-10-05 PROCEDURE — 1159F PR MEDICATION LIST DOCUMENTED IN MEDICAL RECORD: ICD-10-PCS | Mod: CPTII,95,, | Performed by: NURSE PRACTITIONER

## 2022-10-05 PROCEDURE — 4010F ACE/ARB THERAPY RXD/TAKEN: CPT | Mod: CPTII,95,, | Performed by: NURSE PRACTITIONER

## 2022-10-05 PROCEDURE — 99214 PR OFFICE/OUTPT VISIT, EST, LEVL IV, 30-39 MIN: ICD-10-PCS | Mod: 95,,, | Performed by: NURSE PRACTITIONER

## 2022-10-05 PROCEDURE — 1160F PR REVIEW ALL MEDS BY PRESCRIBER/CLIN PHARMACIST DOCUMENTED: ICD-10-PCS | Mod: CPTII,95,, | Performed by: NURSE PRACTITIONER

## 2022-10-05 PROCEDURE — 4010F PR ACE/ARB THEARPY RXD/TAKEN: ICD-10-PCS | Mod: CPTII,95,, | Performed by: NURSE PRACTITIONER

## 2022-10-05 PROCEDURE — 1159F MED LIST DOCD IN RCRD: CPT | Mod: CPTII,95,, | Performed by: NURSE PRACTITIONER

## 2022-10-05 RX ORDER — FUROSEMIDE 20 MG/1
20 TABLET ORAL DAILY PRN
COMMUNITY

## 2022-10-05 RX ORDER — PREGABALIN 50 MG/1
100 CAPSULE ORAL 2 TIMES DAILY
COMMUNITY
Start: 2022-08-22 | End: 2023-02-03 | Stop reason: SDUPTHER

## 2022-10-05 RX ORDER — QUETIAPINE FUMARATE 25 MG/1
25 TABLET, FILM COATED ORAL NIGHTLY
COMMUNITY
Start: 2022-09-21 | End: 2023-02-03 | Stop reason: SDUPTHER

## 2022-10-05 RX ORDER — HYDROMORPHONE HYDROCHLORIDE 2 MG/1
2 TABLET ORAL EVERY 4 HOURS PRN
COMMUNITY
End: 2023-02-03 | Stop reason: SDUPTHER

## 2022-10-05 RX ORDER — OMEPRAZOLE 10 MG/1
10 CAPSULE, DELAYED RELEASE ORAL DAILY
COMMUNITY
End: 2024-02-09

## 2022-10-05 RX ORDER — OXYCODONE AND ACETAMINOPHEN 10; 325 MG/1; MG/1
1 TABLET ORAL EVERY 12 HOURS PRN
COMMUNITY
Start: 2022-04-27 | End: 2023-02-03 | Stop reason: SDUPTHER

## 2022-10-05 RX ORDER — GALCANEZUMAB 120 MG/ML
120 INJECTION, SOLUTION SUBCUTANEOUS
COMMUNITY
Start: 2022-04-28

## 2022-10-05 NOTE — PATIENT INSTRUCTIONS
- Continue Ursodiol 500 mg PO BID for the treatment of PBC.  - Repeat MELD labs in 2 months to monitor liver function.  - Recommend abdominal ultrasound, with AFP measurement for HCC surveillance every 6 months, next due 12/2022.  - Complete vaccination series for Hepatitis A & B.  - Maintain a healthy weight, through diet and exercise.  - Recommend good control of blood pressure, blood sugar and lipids.   - Avoid alcohol and herbal supplements/alternative remedies.  - Return to clinic in 2 month with US and labs prior to visit.

## 2022-10-05 NOTE — PROGRESS NOTES
The patient location is: Home ([place], [place])  The chief complaint leading to consultation is: PBC/Liver Fibrosis    Visit type: audiovisual     Face to Face time with patient: 20  30 minutes of total time spent on the encounter, which includes face to face time and non-face to face time preparing to see the patient (eg, review of tests), Obtaining and/or reviewing separately obtained history, Documenting clinical information in the electronic or other health record, Independently interpreting results (not separately reported) and communicating results to the patient/family/caregiver, or Care coordination (not separately reported).     Each patient to whom he or she provides medical services by telemedicine is:  (1) informed of the relationship between the physician and patient and the respective role of any other health care provider with respect to management of the patient; and (2) notified that he or she may decline to receive medical services by telemedicine and may withdraw from such care at any time.    Notes:       [facility] VISIT ESTABLISHED PT NOTE    REFERRING PROVIDER:  No ref. provider found    CHIEF COMPLAINT: PBC    HPI: This is a 61 y.o. White female with PMH noted below, presenting for follow up. She was last seen in clinic by myself for a virtual visit on 8/26/2022.    She has a history of fatty liver with stage 3 fibrosis on liver biopsy in 10/2020. The liver biopsy was done during laproscopic cholecystectomy on 10/27/2020, and showed chronic portal inflammation with interface activity, with bridging fibrosis. Serological work up was negative for autoimmune and viral etiologies. Labs continue to show normal liver function, with a persistently elevated alkaline phosphatase (> 400); her transaminases are normal. She also had an incidental finding of hemangiomas on cross sectional imaging.    She underwent MRI of the Abdomen with MRCP in June for further evaluation of chronically  "elevated Alk Phos. Imaging showed "mild central intrahepatic biliary ductal prominence and mild dilatation of the common hepatic duct, with gradual tapering to normal caliber distal common bile duct", with no evidence of a filling defect or biliary stricture above the level of the ampulla, and no evidence of main pancreatic ductal dilatation. The radiologist recommended further evaluation with ERCP if ampullary stricture is suspected. The MRI also showed 2 small hepatic hemangiomas (1.5 and 1.2 cm, respectively) in segment VII. EUS was done in late July which showed no gallstones, bile duct strictures or significant pathology in the CBD or pancreatic duct.     She does not have any new complains from liver standpoint. Repeat labs on 8/9/2022 showed an ALP of 760, with normal transaminases and intact synthetic function. ACE level normal. CA 19-9 and AFP were also normal. AMA was positive (127.2), consistent with diagnosis of PBC. Labs also showed that she has not been exposed to, and is not immune to Hepatitis A or B.  She was started on Ursodiol 500 mg PO BID in 8/2022, and has tolerated treatment well. Repeat labs on 9/26 showed significant improvement in ALP (256); blood counts, kidney and liver function tests were normal/stable. She has started vaccination series for Hepatitis A and B, and has received 2 vaccinations thus far.    She is well appearing, and has no signs or symptoms of hepatic decompensation including jaundice, dark urine, abdominal distention, lower extremity edema, hematemesis, melena, or periods of confusion suggestive of hepatic encephalopathy.     Review of patient's allergies indicates:  No Known Allergies  Current Outpatient Medications on File Prior to Visit   Medication Sig Dispense Refill    galcanezumab-gnlm (EMGALITY PEN) 120 mg/mL PnIj 120 mg.      HYDROmorphone (DILAUDID) 2 MG tablet Take 2 mg by mouth every 4 (four) hours as needed.      omeprazole (PRILOSEC) 10 MG capsule Take 10 mg " by mouth once daily.      oxyCODONE-acetaminophen (PERCOCET)  mg per tablet Take 1 tablet by mouth every 12 (twelve) hours as needed.      atorvastatin (LIPITOR) 20 MG tablet Take 20 mg by mouth once daily.      carBAMazepine (CARBATROL) 100 MG CM12 Take 100 mg by mouth 2 (two) times a day.      cyclobenzaprine (FLEXERIL) 10 MG tablet Take 10 mg by mouth once daily.      furosemide (LASIX) 20 MG tablet Take 20 mg by mouth daily as needed.      lisinopriL 10 MG tablet Take 10 mg by mouth once daily.      pregabalin (LYRICA) 50 MG capsule Take 100 mg by mouth 2 (two) times daily.      QUEtiapine (SEROQUEL) 25 MG Tab Take 25 mg by mouth every evening.      traZODone (DESYREL) 50 MG tablet trazodone 50 mg tablet      ursodioL (ACTIGALL) 500 MG tablet Take 1 tablet (500 mg total) by mouth 2 (two) times daily. 60 tablet 11    zonisamide (ZONEGRAN) 25 MG Cap Take 25 mg by mouth once daily.      [DISCONTINUED] celecoxib (CELEBREX) 200 MG capsule Take 200 mg by mouth 2 (two) times daily.      [DISCONTINUED] furosemide (LASIX) 20 MG tablet Take 20 mg by mouth once daily.      [DISCONTINUED] omeprazole (PRILOSEC) 40 MG capsule Take 40 mg by mouth once daily.       No current facility-administered medications on file prior to visit.     PMHX:  has a past medical history of Advanced hepatic fibrosis, Arthritis, Chronic back pain, Fatty liver, Fibromyalgia, GERD (gastroesophageal reflux disease), and HTN (hypertension).    PSHX:  has a past surgical history that includes Laparoscopic cholecystectomy with cholangiography (10/27/2020); Liver biopsy (10/27/2020); Total knee arthroplasty; Endoscopic ultrasound of upper gastrointestinal tract (N/A, 7/29/2022); and ERCP (N/A, 7/29/2022).    FAMILY HISTORY: Negative for liver disease, reviewed in [facility]    SOCIAL HISTORY:   Social History     Tobacco Use   Smoking Status Every Day    Packs/day: 1.00    Types: Cigarettes   Smokeless Tobacco Not on file     Social History      Substance and Sexual Activity   Alcohol Use None     Social History     Substance and Sexual Activity   Drug Use Not on file     ROS:   GENERAL: Denies fever, chills, weight loss/gain, fatigue  HEENT: Denies headaches, dizziness, vision/hearing changes  CARDIOVASCULAR: Denies chest pain, palpitations, or edema  RESPIRATORY: Denies dyspnea, cough  GI: Denies abdominal pain, rectal bleeding, nausea, vomiting. No change in bowel pattern or color  : Denies dysuria, hematuria   SKIN: Denies rash, itching   NEURO: Denies confusion, memory loss, or mood changes  PSYCH: Denies depression or anxiety  HEME/LYMPH: Denies easy bruising or bleeding    PHYSICAL EXAM:   Friendly White female, in no acute distress; alert and oriented to person, place and time.  VITALS: There were no vitals taken for this visit. (Not done - Telehealth visit).  HENT: Normocephalic, without obvious abnormality.   EYES: Sclerae anicteric.  NECK: No obvious masses.  CARDIOVASCULAR: No peripheral edema, per patient report.  RESPIRATORY: Normal respiratory effort.   GI: No abdominal distention per patient report.  EXTREMITIES: Unable to assess.  SKIN: Warm and dry. No jaundice. No rashes noted to exposed skin.  NEURO: Unable to assess.  PSYCH:  Memory intact. Thought and speech pattern appropriate. Behavior normal. No depression or anxiety noted.    DIAGNOSTIC STUDIES:    LIVER BIOPSY 10/27/2020:        MRI ABDOMEN W/W/O CONTRAST WITH MRCP 6/8/2022:    COMPARISON:   No MR or CT comparison.     FINDINGS:     Normal liver morphology without parenchymal signal alteration on in and   opposed phase images.  Subcentimeter cyst located high within the dome   of hepatic segment VIII.  Two adjacent oval well-circumscribed T2   hyperintense masses measuring 1.5 and 1.2 cm respectively located   within hepatic segment VII each show peripheral discontinuous nodular   enhancement, similar to that of blood pool and continuous centripetal   enhancement on portal  venous and delayed phases, most compatible with   hemangiomas.  Spleen and adrenal glands are normal.  No focal MR   abnormality of the pancreas.  Post cholecystectomy.     Mild central intrahepatic biliary ductal prominence and mild dilatation   of the common hepatic duct measuring up to 0.8 cm in greatest diameter.   Gradual tapering to normal caliber distal common bile duct.  No MR   evidence of filling defect or biliary stricture above the level of the   ampulla.  No evidence of main pancreatic ductal dilatation.     Normal renal enhancement without hydronephrosis.  Few small simple   renal cysts.     Nonspecific borderline in size stephanie hepatis lymph node.  No enlarged   lymph nodes within the included abdomen.  No acute vascular abnormality.     No focal MR abnormality of the stomach allowing for limitations related   to relative underdistention.  No MR evidence of acute bowel abnormality   within the included abdomen.  No evidence of free intraperitoneal fluid.    IMPRESSION:   MILD CENTRAL INTRAHEPATIC BILIARY DUCTAL PROMINENCE AND MILD DILATATION   OF THE COMMON HEPATIC DUCT WITH GRADUAL TAPERING TO NORMAL CALIBER   DISTAL COMMON BILE DUCT.  FINDINGS ARE AT LEAST IN PART THOUGHT TO BE   RELATED TO RESERVOIR EFFECT POST CHOLECYSTECTOMY.  IF AMPULLARY   STRICTURE IS SUSPECTED THEN ERCP MAY BE CONSIDERED FOR FURTHER   EVALUATION.     NO MR EVIDENCE OF CHOLEDOCHOLITHIASIS OR BILIARY STRICTURE ABOVE THE   LEVEL OF THE AMPULLA.     TWO ADJACENT SMALL HEMANGIOMAS WITHIN HEPATIC SEGMENT VII.    EUS 7/29/2022:      Findings:        ENDOSONOGRAPHIC FINDING:        The esophagus, stomach and duodenum and adjacent structures were        visualized endosonographically.        Endosonographic imaging in the visualized portion of the liver        showed no lesion.        There was no sign of significant endosonographic abnormality in the        pancreatic head.        There was no sign of significant endosonographic  abnormality in the        common bile duct. The maximum diameter of the duct was 6 mm. No        stones, no biliary sludge, ducts of normal caliber and ducts with        regular contour were identified.   Impression:    - There was no sign of significant pathology in                          the pancreatic head.                          - There was no sign of significant pathology in                          the common bile duct.                          - No EUS evidence for biliary obstruction.                          - No specimens collected.     ASSESSMENT & PLAN:  61 y.o. White female with:    1. Primary biliary cholangitis  AFP Tumor Marker    Ferritin    CBC Auto Differential    Protime-INR    US Abdomen Complete      2. Liver fibrosis  AFP Tumor Marker    Ferritin    CBC Auto Differential    Protime-INR    US Abdomen Complete      3. Intrahepatic bile duct dilation  AFP Tumor Marker    Ferritin    CBC Auto Differential    Protime-INR    US Abdomen Complete      4. Fatty liver  AFP Tumor Marker    Ferritin    CBC Auto Differential    Protime-INR    US Abdomen Complete      5. Hepatic hemangioma  AFP Tumor Marker    Ferritin    CBC Auto Differential    Protime-INR    US Abdomen Complete        - Continue Ursodiol 500 mg PO BID for the treatment of PBC.  - Repeat MELD labs in 2 months to monitor liver function.  - Recommend abdominal ultrasound, with AFP measurement for HCC surveillance every 6 months, next due 12/2022.  - Complete vaccination series for Hepatitis A & B.  - Maintain a healthy weight, through diet and exercise.  - Recommend good control of blood pressure, blood sugar and lipids.   - Avoid alcohol and herbal supplements/alternative remedies.  - Return to clinic in 2 month with US and labs prior to visit.     Thank you for allowing me to participate in the care of Desirae Etienne       Hepatology Nurse Practitioner  [facility]  10/5/2022 @ [id number]    CC'ed  note to:   No ref. provider found  Janice Tadeo NP

## 2022-10-05 NOTE — Clinical Note
Please arrange labs and US in 2 months, with f/u visit with me 1 week later to review results. Thanks!

## 2022-10-06 ENCOUNTER — TELEPHONE (OUTPATIENT)
Dept: HEPATOLOGY | Facility: CLINIC | Age: 62
End: 2022-10-06
Payer: MEDICARE

## 2022-10-06 DIAGNOSIS — K83.8 INTRAHEPATIC BILE DUCT DILATION: ICD-10-CM

## 2022-10-06 DIAGNOSIS — K74.00 LIVER FIBROSIS: ICD-10-CM

## 2022-10-06 DIAGNOSIS — K76.0 FATTY LIVER: ICD-10-CM

## 2022-10-06 DIAGNOSIS — K74.3 PRIMARY BILIARY CHOLANGITIS: Primary | ICD-10-CM

## 2022-10-06 DIAGNOSIS — D18.03 HEPATIC HEMANGIOMA: ICD-10-CM

## 2022-10-06 NOTE — TELEPHONE ENCOUNTER
Contacted to patient. Scheduled follow up visit and fax lab order to [facility].  Also mailed lab order and appointment reminder to patient.

## 2022-10-06 NOTE — TELEPHONE ENCOUNTER
----- Message from Neha Hall NP sent at 10/5/2022  1:34 PM CDT -----  Please arrange labs and US in 2 months, with f/u visit with me 1 week later to review results. Thanks!

## 2022-12-06 ENCOUNTER — TELEPHONE (OUTPATIENT)
Dept: HEPATOLOGY | Facility: CLINIC | Age: 62
End: 2022-12-06
Payer: MEDICARE

## 2022-12-06 NOTE — TELEPHONE ENCOUNTER
Received call from [facility] at [phone] stating AFP lab draw was missed and they were unable to reach the patient - phone disconneceted.    Call returned to [facility] and spoke with Elizabeth. Stated they were unable to reach the patient. Informed I will reach out to them and get back to you.    Call placed to the patient's h/m [phone]. No Answer. Left VM message to return to [facility] 1 test was missed and needed to be drawn. This is Shavonne calling from Neha's [facility].    Call placed to emergency contact , Case [id number]. Informed Case what had happen. Desirae was present in the conversation. Case stated they did not have transportation anymore. Can we go tomorrow? Yes, you can.    Call returned to [facility] and Tania, supervisor answered the phone. Informed Desirae's phone was on silent. Spoke to the Pt and her . They will come in tomorrow 12/7/22 for the AFP.

## 2022-12-06 NOTE — TELEPHONE ENCOUNTER
----- Message from Pat Alberto sent at 12/6/2022 12:42 PM CST -----  Regarding: Update  The lab at [facility] will need the pt to come back for more blood work. The Tumor marker was missed. The other blood tests were completed. Pt's phone number that they had on file was disconnected.      [facility] @ [phone]

## 2023-01-30 ENCOUNTER — TELEPHONE (OUTPATIENT)
Dept: HEPATOLOGY | Facility: CLINIC | Age: 63
End: 2023-01-30
Payer: MEDICARE

## 2023-01-30 ENCOUNTER — PATIENT MESSAGE (OUTPATIENT)
Dept: HEPATOLOGY | Facility: CLINIC | Age: 63
End: 2023-01-30
Payer: MEDICARE

## 2023-01-30 NOTE — TELEPHONE ENCOUNTER
----- Message from Pam Frost sent at 1/30/2023  9:55 AM CST -----  Regarding: regarding r/s appt  PT returning missed call about r/s appt date  No further info provided       Please contact at your convenience [phone]

## 2023-02-03 ENCOUNTER — TELEPHONE (OUTPATIENT)
Dept: HEPATOLOGY | Facility: CLINIC | Age: 63
End: 2023-02-03

## 2023-02-03 ENCOUNTER — OFFICE VISIT (OUTPATIENT)
Dept: HEPATOLOGY | Facility: CLINIC | Age: 63
End: 2023-02-03
Payer: COMMERCIAL

## 2023-02-03 DIAGNOSIS — K76.0 FATTY LIVER: ICD-10-CM

## 2023-02-03 DIAGNOSIS — K74.02 ADVANCED HEPATIC FIBROSIS: ICD-10-CM

## 2023-02-03 DIAGNOSIS — D18.03 HEPATIC HEMANGIOMA: ICD-10-CM

## 2023-02-03 DIAGNOSIS — K74.00 LIVER FIBROSIS: ICD-10-CM

## 2023-02-03 DIAGNOSIS — Z85.05 ENCOUNTER FOR FOLLOW-UP SURVEILLANCE OF LIVER CANCER: ICD-10-CM

## 2023-02-03 DIAGNOSIS — K74.3 PRIMARY BILIARY CHOLANGITIS: Primary | ICD-10-CM

## 2023-02-03 DIAGNOSIS — Z08 ENCOUNTER FOR FOLLOW-UP SURVEILLANCE OF LIVER CANCER: ICD-10-CM

## 2023-02-03 PROBLEM — M48.02 SPINAL STENOSIS IN CERVICAL REGION: Status: ACTIVE | Noted: 2022-07-18

## 2023-02-03 PROBLEM — J01.90 ACUTE SINUSITIS: Status: ACTIVE | Noted: 2019-03-19

## 2023-02-03 PROBLEM — M62.830 SPASM OF BACK MUSCLES: Status: ACTIVE | Noted: 2019-03-19

## 2023-02-03 PROBLEM — R51.9 CHRONIC HEADACHE DISORDER: Status: ACTIVE | Noted: 2023-02-03

## 2023-02-03 PROBLEM — M79.609 PAIN IN LIMB: Status: ACTIVE | Noted: 2023-02-03

## 2023-02-03 PROBLEM — J30.9 ALLERGIC RHINITIS: Status: ACTIVE | Noted: 2023-02-03

## 2023-02-03 PROBLEM — R42 DIZZINESS AND GIDDINESS: Status: ACTIVE | Noted: 2023-02-03

## 2023-02-03 PROBLEM — K21.9 GASTROESOPHAGEAL REFLUX DISEASE: Status: ACTIVE | Noted: 2023-02-03

## 2023-02-03 PROBLEM — M50.21 HERNIATED NUCLEUS PULPOSUS, C3-4: Status: ACTIVE | Noted: 2022-07-18

## 2023-02-03 PROBLEM — M47.812 CERVICAL SPONDYLOSIS WITHOUT MYELOPATHY: Status: ACTIVE | Noted: 2022-07-18

## 2023-02-03 PROBLEM — M47.817 LUMBOSACRAL SPONDYLOSIS WITHOUT MYELOPATHY: Status: ACTIVE | Noted: 2023-02-03

## 2023-02-03 PROBLEM — E78.5 HYPERLIPIDEMIA: Status: ACTIVE | Noted: 2023-02-03

## 2023-02-03 PROBLEM — Z13.29 SCREENING FOR OTHER AND UNSPECIFIED ENDOCRINE, NUTRITIONAL, METABOLIC AND IMMUNITY DISORDERS: Status: ACTIVE | Noted: 2020-03-19

## 2023-02-03 PROBLEM — M85.80 OSTEOPENIA: Status: ACTIVE | Noted: 2020-05-19

## 2023-02-03 PROBLEM — Z72.0 TOBACCO USER: Status: ACTIVE | Noted: 2020-03-19

## 2023-02-03 PROBLEM — Z13.31 ENCOUNTER FOR SCREENING FOR DEPRESSION: Status: ACTIVE | Noted: 2020-03-19

## 2023-02-03 PROBLEM — G47.00 INSOMNIA: Status: ACTIVE | Noted: 2023-02-03

## 2023-02-03 PROBLEM — Z13.0 SCREENING FOR OTHER AND UNSPECIFIED ENDOCRINE, NUTRITIONAL, METABOLIC AND IMMUNITY DISORDERS: Status: ACTIVE | Noted: 2020-03-19

## 2023-02-03 PROBLEM — I10 HYPERTENSION: Status: ACTIVE | Noted: 2023-02-03

## 2023-02-03 PROBLEM — R10.33 UMBILICAL PAIN: Status: ACTIVE | Noted: 2018-06-25

## 2023-02-03 PROBLEM — F41.1 ANXIETY STATE: Status: ACTIVE | Noted: 2023-02-03

## 2023-02-03 PROBLEM — F32.A DEPRESSIVE DISORDER: Status: ACTIVE | Noted: 2023-02-03

## 2023-02-03 PROBLEM — N95.1 MENOPAUSAL SYNDROME: Status: ACTIVE | Noted: 2023-02-03

## 2023-02-03 PROBLEM — N39.0 ACUTE URINARY TRACT INFECTION: Status: ACTIVE | Noted: 2017-04-19

## 2023-02-03 PROBLEM — R53.83 FATIGUE: Status: ACTIVE | Noted: 2023-02-03

## 2023-02-03 PROBLEM — M94.9 DISORDER OF BONE AND ARTICULAR CARTILAGE: Status: ACTIVE | Noted: 2023-02-03

## 2023-02-03 PROBLEM — M54.17 LUMBOSACRAL RADICULOPATHY: Status: ACTIVE | Noted: 2023-02-03

## 2023-02-03 PROBLEM — G62.9 PERIPHERAL NERVE DISEASE: Status: ACTIVE | Noted: 2023-02-03

## 2023-02-03 PROBLEM — E66.3 OVERWEIGHT: Status: ACTIVE | Noted: 2021-05-04

## 2023-02-03 PROBLEM — M12.9 ARTHROPATHY: Status: ACTIVE | Noted: 2018-05-08

## 2023-02-03 PROBLEM — R60.9 EDEMA: Status: ACTIVE | Noted: 2023-02-03

## 2023-02-03 PROBLEM — R32 URINARY INCONTINENCE: Status: ACTIVE | Noted: 2020-03-19

## 2023-02-03 PROBLEM — G89.29 CHRONIC HEADACHE DISORDER: Status: ACTIVE | Noted: 2023-02-03

## 2023-02-03 PROBLEM — R39.9 SYMPTOMS INVOLVING URINARY SYSTEM: Status: ACTIVE | Noted: 2021-05-04

## 2023-02-03 PROBLEM — K52.9 CHRONIC DIARRHEA: Status: ACTIVE | Noted: 2020-05-19

## 2023-02-03 PROBLEM — M89.9 DISORDER OF BONE AND ARTICULAR CARTILAGE: Status: ACTIVE | Noted: 2023-02-03

## 2023-02-03 PROBLEM — R09.02 HYPOXEMIA: Status: ACTIVE | Noted: 2023-02-03

## 2023-02-03 PROBLEM — Z13.228 SCREENING FOR OTHER AND UNSPECIFIED ENDOCRINE, NUTRITIONAL, METABOLIC AND IMMUNITY DISORDERS: Status: ACTIVE | Noted: 2020-03-19

## 2023-02-03 PROBLEM — Z23 INFLUENZA VACCINATION GIVEN: Status: ACTIVE | Noted: 2020-03-19

## 2023-02-03 PROBLEM — Z79.82 LONG TERM CURRENT USE OF ASPIRIN: Status: ACTIVE | Noted: 2023-02-03

## 2023-02-03 PROBLEM — Z12.31 OTHER SCREENING MAMMOGRAM: Status: ACTIVE | Noted: 2020-05-19

## 2023-02-03 PROBLEM — F17.200 TOBACCO DEPENDENCE SYNDROME: Status: ACTIVE | Noted: 2023-02-03

## 2023-02-03 PROBLEM — G43.909 MIGRAINE: Status: ACTIVE | Noted: 2018-05-08

## 2023-02-03 PROBLEM — H61.20 IMPACTED CERUMEN: Status: ACTIVE | Noted: 2019-03-19

## 2023-02-03 PROCEDURE — 4010F ACE/ARB THERAPY RXD/TAKEN: CPT | Mod: CPTII,95,, | Performed by: NURSE PRACTITIONER

## 2023-02-03 PROCEDURE — 1159F PR MEDICATION LIST DOCUMENTED IN MEDICAL RECORD: ICD-10-PCS | Mod: CPTII,95,, | Performed by: NURSE PRACTITIONER

## 2023-02-03 PROCEDURE — 1160F PR REVIEW ALL MEDS BY PRESCRIBER/CLIN PHARMACIST DOCUMENTED: ICD-10-PCS | Mod: CPTII,95,, | Performed by: NURSE PRACTITIONER

## 2023-02-03 PROCEDURE — 99213 OFFICE O/P EST LOW 20 MIN: CPT | Mod: 95,,, | Performed by: NURSE PRACTITIONER

## 2023-02-03 PROCEDURE — 1160F RVW MEDS BY RX/DR IN RCRD: CPT | Mod: CPTII,95,, | Performed by: NURSE PRACTITIONER

## 2023-02-03 PROCEDURE — 1159F MED LIST DOCD IN RCRD: CPT | Mod: CPTII,95,, | Performed by: NURSE PRACTITIONER

## 2023-02-03 PROCEDURE — 4010F PR ACE/ARB THEARPY RXD/TAKEN: ICD-10-PCS | Mod: CPTII,95,, | Performed by: NURSE PRACTITIONER

## 2023-02-03 PROCEDURE — 99213 PR OFFICE/OUTPT VISIT, EST, LEVL III, 20-29 MIN: ICD-10-PCS | Mod: 95,,, | Performed by: NURSE PRACTITIONER

## 2023-02-03 RX ORDER — LISINOPRIL 5 MG/1
5 TABLET ORAL
COMMUNITY
Start: 2023-01-26

## 2023-02-03 RX ORDER — PREGABALIN 100 MG/1
100 CAPSULE ORAL 2 TIMES DAILY
COMMUNITY
Start: 2023-01-13 | End: 2023-07-24

## 2023-02-03 RX ORDER — LINACLOTIDE 145 UG/1
145 CAPSULE, GELATIN COATED ORAL
COMMUNITY
Start: 2022-12-12 | End: 2024-02-09

## 2023-02-03 RX ORDER — OXYCODONE HYDROCHLORIDE 10 MG/1
10 TABLET ORAL 2 TIMES DAILY PRN
COMMUNITY
Start: 2023-01-30

## 2023-02-03 RX ORDER — QUETIAPINE FUMARATE 50 MG/1
50 TABLET, FILM COATED ORAL NIGHTLY
COMMUNITY
Start: 2023-01-26

## 2023-02-03 NOTE — PROGRESS NOTES
The patient location is: Home ([place], [place])  The chief complaint leading to consultation is: PBC/Liver Fibrosis    Visit type: audiovisual     Face to Face time with patient: 10  20 minutes of total time spent on the encounter, which includes face to face time and non-face to face time preparing to see the patient (eg, review of tests), Obtaining and/or reviewing separately obtained history, Documenting clinical information in the electronic or other health record, Independently interpreting results (not separately reported) and communicating results to the patient/family/caregiver, or Care coordination (not separately reported).     Each patient to whom he or she provides medical services by telemedicine is:  (1) informed of the relationship between the physician and patient and the respective role of any other health care provider with respect to management of the patient; and (2) notified that he or she may decline to receive medical services by telemedicine and may withdraw from such care at any time.    Notes:       [facility] VISIT ESTABLISHED PT NOTE    REFERRING PROVIDER:  No ref. provider found    CHIEF COMPLAINT: PBC    HPI: This is a 62 y.o. White female with PMH noted below, presenting for follow up. She was last seen in clinic by myself for a virtual visit in 10/2022.    She has a history of fatty liver with stage 3 fibrosis on liver biopsy in 10/2020. The liver biopsy was done during laproscopic cholecystectomy on 10/27/2020, and showed chronic portal inflammation with interface activity, with bridging fibrosis. Serological work up was negative for autoimmune and viral etiologies. Labs continued to show normal liver function, with a persistently elevated alkaline phosphatase (> 400); her transaminases are normal. She also had an incidental finding of hemangiomas on cross sectional imaging.    She underwent MRI of the Abdomen with MRCP in June 2022 for further evaluation of chronically  "elevated Alk Phos. Imaging showed "mild central intrahepatic biliary ductal prominence and mild dilatation of the common hepatic duct, with gradual tapering to normal caliber distal common bile duct", with no evidence of a filling defect or biliary stricture above the level of the ampulla, and no evidence of main pancreatic ductal dilatation. The radiologist recommended further evaluation with ERCP if ampullary stricture is suspected. The MRI also showed 2 small hepatic hemangiomas (1.5 and 1.2 cm, respectively) in segment VII. EUS was done in late July 2022 and showed no gallstones, bile duct strictures or significant pathology in the CBD or pancreatic duct.     Repeat labs in 8/2022 showed an ALP of 760, with normal transaminases and intact synthetic function. ACE level normal. CA 19-9 and AFP were also normal. AMA was positive (127.2), consistent with diagnosis of PBC. Labs also showed that she has not been exposed to, and is not immune to Hepatitis A or B. She has started vaccination series for Hepatitis A and B, and has received 2 vaccinations thus far. She was started on Ursodiol 500 mg PO BID in 8/2022, and has tolerated treatment well.     Follow up labs in September, and then again in December 2022 showed significant improvement in ALP (250's); blood counts, kidney and liver function tests remain normal/stable. She has lost 10 pounds over the past few months, through dietary changes. Abdominal US in 12/2022 for HCC surveillance showed no concerning liver lesions. AFP normal. She is well appearing, and has no signs or symptoms of hepatic decompensation including jaundice, dark urine, abdominal distention, lower extremity edema, hematemesis, melena, or periods of confusion suggestive of hepatic encephalopathy.     Review of patient's allergies indicates:  No Known Allergies  Current Outpatient Medications on File Prior to Visit   Medication Sig Dispense Refill    atorvastatin (LIPITOR) 20 MG tablet Take 20 mg " by mouth once daily.      carBAMazepine (CARBATROL) 100 MG CM12 Take 100 mg by mouth 2 (two) times a day.      cyclobenzaprine (FLEXERIL) 10 MG tablet Take 10 mg by mouth once daily.      furosemide (LASIX) 20 MG tablet Take 20 mg by mouth daily as needed.      galcanezumab-gnlm (EMGALITY PEN) 120 mg/mL PnIj 120 mg.      HYDROmorphone (DILAUDID) 2 MG tablet Take 2 mg by mouth every 4 (four) hours as needed.      lisinopriL 10 MG tablet Take 10 mg by mouth once daily.      omeprazole (PRILOSEC) 10 MG capsule Take 10 mg by mouth once daily.      oxyCODONE-acetaminophen (PERCOCET)  mg per tablet Take 1 tablet by mouth every 12 (twelve) hours as needed.      pregabalin (LYRICA) 50 MG capsule Take 100 mg by mouth 2 (two) times daily.      QUEtiapine (SEROQUEL) 25 MG Tab Take 25 mg by mouth every evening.      traZODone (DESYREL) 50 MG tablet trazodone 50 mg tablet      ursodioL (ACTIGALL) 500 MG tablet Take 1 tablet (500 mg total) by mouth 2 (two) times daily. 60 tablet 11    zonisamide (ZONEGRAN) 25 MG Cap Take 25 mg by mouth once daily.       No current facility-administered medications on file prior to visit.     PMHX:  has a past medical history of Advanced hepatic fibrosis, Arthritis, Chronic back pain, Fatty liver, Fibromyalgia, GERD (gastroesophageal reflux disease), HTN (hypertension), and Primary biliary cholangitis.    PSHX:  has a past surgical history that includes Laparoscopic cholecystectomy with cholangiography (10/27/2020); Liver biopsy (10/27/2020); Total knee arthroplasty; Endoscopic ultrasound of upper gastrointestinal tract (N/A, 7/29/2022); and ERCP (N/A, 7/29/2022).    FAMILY HISTORY: Negative for liver disease, reviewed in EPIC    SOCIAL HISTORY:   Social History     Tobacco Use   Smoking Status Every Day    Packs/day: 1.00    Types: Cigarettes   Smokeless Tobacco Not on file     Social History     Substance and Sexual Activity   Alcohol Use None     Social History     Substance and Sexual  Activity   Drug Use Not on file     ROS:   GENERAL: Denies fever, chills, weight loss/gain, fatigue  HEENT: Denies headaches, dizziness, vision/hearing changes  CARDIOVASCULAR: Denies chest pain, palpitations, or edema  RESPIRATORY: Denies dyspnea, cough  GI: Denies abdominal pain, rectal bleeding, nausea, vomiting. No change in bowel pattern or color  : Denies dysuria, hematuria   SKIN: Denies rash, itching   NEURO: Denies confusion, memory loss, or mood changes  PSYCH: Denies depression or anxiety  HEME/LYMPH: Denies easy bruising or bleeding    PHYSICAL EXAM:   Friendly White female, in no acute distress; alert and oriented to person, place and time.  VITALS: There were no vitals taken for this visit. (Not done - Telehealth visit).  HENT: Normocephalic, without obvious abnormality.   EYES: Sclerae anicteric.  NECK: No obvious masses.  CARDIOVASCULAR: No peripheral edema, per patient report.  RESPIRATORY: Normal respiratory effort.   GI: No abdominal distention per patient report.  EXTREMITIES: Unable to assess.  SKIN: Warm and dry. No jaundice. No rashes noted to exposed skin.  NEURO: Unable to assess.  PSYCH:  Memory intact. Thought and speech pattern appropriate. Behavior normal. No depression or anxiety noted.    DIAGNOSTIC STUDIES:    LIVER BIOPSY 10/27/2020:        MRI ABDOMEN W/W/O CONTRAST WITH MRCP 6/8/2022:    COMPARISON:   No MR or CT comparison.     FINDINGS:     Normal liver morphology without parenchymal signal alteration on in and   opposed phase images.  Subcentimeter cyst located high within the dome   of hepatic segment VIII.  Two adjacent oval well-circumscribed T2   hyperintense masses measuring 1.5 and 1.2 cm respectively located   within hepatic segment VII each show peripheral discontinuous nodular   enhancement, similar to that of blood pool and continuous centripetal   enhancement on portal venous and delayed phases, most compatible with   hemangiomas.  Spleen and adrenal glands are  normal.  No focal MR   abnormality of the pancreas.  Post cholecystectomy.     Mild central intrahepatic biliary ductal prominence and mild dilatation   of the common hepatic duct measuring up to 0.8 cm in greatest diameter.   Gradual tapering to normal caliber distal common bile duct.  No MR   evidence of filling defect or biliary stricture above the level of the   ampulla.  No evidence of main pancreatic ductal dilatation.     Normal renal enhancement without hydronephrosis.  Few small simple   renal cysts.     Nonspecific borderline in size stephanie hepatis lymph node.  No enlarged   lymph nodes within the included abdomen.  No acute vascular abnormality.     No focal MR abnormality of the stomach allowing for limitations related   to relative underdistention.  No MR evidence of acute bowel abnormality   within the included abdomen.  No evidence of free intraperitoneal fluid.    IMPRESSION:   MILD CENTRAL INTRAHEPATIC BILIARY DUCTAL PROMINENCE AND MILD DILATATION   OF THE COMMON HEPATIC DUCT WITH GRADUAL TAPERING TO NORMAL CALIBER   DISTAL COMMON BILE DUCT.  FINDINGS ARE AT LEAST IN PART THOUGHT TO BE   RELATED TO RESERVOIR EFFECT POST CHOLECYSTECTOMY.  IF AMPULLARY   STRICTURE IS SUSPECTED THEN ERCP MAY BE CONSIDERED FOR FURTHER   EVALUATION.     NO MR EVIDENCE OF CHOLEDOCHOLITHIASIS OR BILIARY STRICTURE ABOVE THE   LEVEL OF THE AMPULLA.     TWO ADJACENT SMALL HEMANGIOMAS WITHIN HEPATIC SEGMENT VII.    EUS 7/29/2022:      Findings:        ENDOSONOGRAPHIC FINDING:        The esophagus, stomach and duodenum and adjacent structures were        visualized endosonographically.        Endosonographic imaging in the visualized portion of the liver        showed no lesion.        There was no sign of significant endosonographic abnormality in the        pancreatic head.        There was no sign of significant endosonographic abnormality in the        common bile duct. The maximum diameter of the duct was 6 mm. No         stones, no biliary sludge, ducts of normal caliber and ducts with        regular contour were identified.   Impression:    - There was no sign of significant pathology in                          the pancreatic head.                          - There was no sign of significant pathology in                          the common bile duct.                          - No EUS evidence for biliary obstruction.                          - No specimens collected.     US ABDOMEN COMPLETE 12/8/2022:    FINDINGS:   The visualized aorta is unremarkable.   The IVC is unremarkable.     The liver measures 13.3 cm in length, normal.  Lobulated echogenic   adjacent lesions are once again demonstrated in segment 7, compatible   with known hemangiomas.  No additional mass is visualized.  Slightly   coarsened echotexture is present without evidence of contour nodularity.     The portal vein and IVC are patent.   There is no free peritoneal fluid within the abdomen detected.     The pancreas visualized has a normal appearance.     The gallbladder is surgically absent.   The common duct measures 3 mm.     The right kidney images demonstrate adequate corticomedullary   differentiation and normal Color Doppler evaluation. The right kidney   measures 9.5 cm in length.     The left kidney images demonstrate adequate corticomedullary   differentiation and normal Color Doppler evaluation. The left kidney   measures 9.4 cm cm in length.     The spleen measures 11 cm.     IMPRESSION:   1. Echogenic lesions within the liver correspond with known   hemangiomas.  There is no new hepatic lesion.   2. Coarsened echotexture of the liver suggests sequela of chronic   hepatocellular disease.  There is no evidence of steatosis or cirrhosis.    ASSESSMENT & PLAN:  62 y.o. White female with:    1. Primary biliary cholangitis  Hepatic Function Panel    Hepatic Function Panel      2. Fatty liver  Hepatic Function Panel    Hepatic Function Panel      3. Advanced  hepatic fibrosis  Hepatic Function Panel    Hepatic Function Panel      4. Hepatic hemangioma  Hepatic Function Panel    Hepatic Function Panel      5. Encounter for follow-up surveillance of liver cancer  Hepatic Function Panel    Hepatic Function Panel        - Continue Ursodiol 500 mg PO BID for the treatment of PBC.  - Repeat labs in 3 months to monitor liver function ([facility] only).  - Recommend abdominal ultrasound, with AFP measurement for HCC surveillance every 6 months, next due 6/2023.  - Maintain a healthy weight, through diet and exercise.  - Recommend good control of blood pressure, blood sugar and lipids.   - Avoid alcohol and herbal supplements/alternative remedies.  - Return to clinic to be determined by lab results.    Thank you for allowing me to participate in the care of Desirae Etienne       Hepatology Nurse Practitioner  [facility]    CC'ed note to:   No ref. provider found  Janice Tadeo NP

## 2023-02-03 NOTE — TELEPHONE ENCOUNTER
----- Message from Neha Hall NP sent at 2/3/2023  3:35 PM CST -----  Please mail patient a copy of the lab order for HFP. RTC TBD. Thanks!

## 2023-02-03 NOTE — PATIENT INSTRUCTIONS
- Continue Ursodiol 500 mg PO BID for the treatment of PBC.  - Repeat labs in 3 months to monitor liver function ([facility] only).  - Recommend abdominal ultrasound, with AFP measurement for HCC surveillance every 6 months, next due 6/2023.  - Maintain a healthy weight, through diet and exercise.  - Recommend good control of blood pressure, blood sugar and lipids.   - Avoid alcohol and herbal supplements/alternative remedies.  - Return to clinic to be determined by lab results.

## 2023-05-08 PROBLEM — Z13.228 SCREENING FOR OTHER AND UNSPECIFIED ENDOCRINE, NUTRITIONAL, METABOLIC AND IMMUNITY DISORDERS: Status: RESOLVED | Noted: 2020-03-19 | Resolved: 2023-05-08

## 2023-05-08 PROBLEM — Z85.05 ENCOUNTER FOR FOLLOW-UP SURVEILLANCE OF LIVER CANCER: Status: RESOLVED | Noted: 2022-06-29 | Resolved: 2023-05-08

## 2023-05-08 PROBLEM — Z13.0 SCREENING FOR OTHER AND UNSPECIFIED ENDOCRINE, NUTRITIONAL, METABOLIC AND IMMUNITY DISORDERS: Status: RESOLVED | Noted: 2020-03-19 | Resolved: 2023-05-08

## 2023-05-08 PROBLEM — Z08 ENCOUNTER FOR FOLLOW-UP SURVEILLANCE OF LIVER CANCER: Status: RESOLVED | Noted: 2022-06-29 | Resolved: 2023-05-08

## 2023-05-08 PROBLEM — Z13.29 SCREENING FOR OTHER AND UNSPECIFIED ENDOCRINE, NUTRITIONAL, METABOLIC AND IMMUNITY DISORDERS: Status: RESOLVED | Noted: 2020-03-19 | Resolved: 2023-05-08

## 2023-05-08 PROBLEM — J01.90 ACUTE SINUSITIS: Status: RESOLVED | Noted: 2019-03-19 | Resolved: 2023-05-08

## 2023-05-08 PROBLEM — N39.0 ACUTE URINARY TRACT INFECTION: Status: RESOLVED | Noted: 2017-04-19 | Resolved: 2023-05-08

## 2023-07-13 ENCOUNTER — TELEPHONE (OUTPATIENT)
Dept: HEPATOLOGY | Facility: CLINIC | Age: 63
End: 2023-07-13
Payer: MEDICARE

## 2023-07-13 DIAGNOSIS — K74.3 PRIMARY BILIARY CHOLANGITIS: ICD-10-CM

## 2023-07-13 DIAGNOSIS — K74.00 LIVER FIBROSIS: ICD-10-CM

## 2023-07-13 RX ORDER — URSODIOL 500 MG/1
500 TABLET, FILM COATED ORAL 2 TIMES DAILY
Qty: 60 TABLET | Refills: 11 | Status: SHIPPED | OUTPATIENT
Start: 2023-07-13 | End: 2023-07-24 | Stop reason: SDUPTHER

## 2023-07-13 NOTE — TELEPHONE ENCOUNTER
----- Message from Neha Hall NP sent at 7/13/2023 11:12 AM CDT -----  Please contact patient to arrange a f/u visit with me now (virtual visit okay). Thanks!

## 2023-07-13 NOTE — TELEPHONE ENCOUNTER
----- Message from Tammy Zazueta sent at 7/13/2023  8:41 AM CDT -----  Regarding: Rx refill  Contact: Desirae Etienne  RX Name:ursodioL (ACTIGALL) 500 MG tablet    How is it taken: Take 1 tablet (500 mg total) by mouth 2 (two) times daily. - Oral    Quantity: 60 tablet       Preferred Pharmacy with phone number:    [facility] - [place], [place] - [address] [address]  [address] [address] [place] [address]  Phone: [phone] Fax: [phone]         Ordering Provider: Neha Boo NP       Contact Preference: [phone]    Addl info: Patient also asking for a Rx for Arthritis in her back. Please advise.

## 2023-07-13 NOTE — TELEPHONE ENCOUNTER
Neha Hall NP sent to Yana Price MA  Caller: Unspecified (Today, 11:23 AM)  She can try OTC Tylenol Arthritis, but should not Acetaminophen intake of more than 2,000 mg per day. Prescribed medication will have to come through per PCP. Please let her know. Thank You    Called pt back to give message from . Neha   Had to leave v/m

## 2023-07-17 ENCOUNTER — TELEPHONE (OUTPATIENT)
Dept: HEPATOLOGY | Facility: CLINIC | Age: 63
End: 2023-07-17
Payer: MEDICARE

## 2023-07-17 NOTE — TELEPHONE ENCOUNTER
Called the patient to reschedule a follow up visit (provider is out).  No answer, left voicemail with call back # [phone]

## 2023-07-24 ENCOUNTER — OFFICE VISIT (OUTPATIENT)
Dept: HEPATOLOGY | Facility: CLINIC | Age: 63
End: 2023-07-24
Payer: COMMERCIAL

## 2023-07-24 VITALS — WEIGHT: 141 LBS | BODY MASS INDEX: 22.13 KG/M2 | HEIGHT: 67 IN

## 2023-07-24 DIAGNOSIS — D18.03 HEPATIC HEMANGIOMA: ICD-10-CM

## 2023-07-24 DIAGNOSIS — K74.00 LIVER FIBROSIS: ICD-10-CM

## 2023-07-24 DIAGNOSIS — K74.3 PRIMARY BILIARY CHOLANGITIS: Primary | ICD-10-CM

## 2023-07-24 DIAGNOSIS — K74.02 ADVANCED HEPATIC FIBROSIS: ICD-10-CM

## 2023-07-24 DIAGNOSIS — K76.0 NONALCOHOLIC FATTY LIVER DISEASE: ICD-10-CM

## 2023-07-24 PROCEDURE — 1160F PR REVIEW ALL MEDS BY PRESCRIBER/CLIN PHARMACIST DOCUMENTED: ICD-10-PCS | Mod: CPTII,95,, | Performed by: NURSE PRACTITIONER

## 2023-07-24 PROCEDURE — 3008F BODY MASS INDEX DOCD: CPT | Mod: CPTII,95,, | Performed by: NURSE PRACTITIONER

## 2023-07-24 PROCEDURE — 1159F MED LIST DOCD IN RCRD: CPT | Mod: CPTII,95,, | Performed by: NURSE PRACTITIONER

## 2023-07-24 PROCEDURE — 4010F ACE/ARB THERAPY RXD/TAKEN: CPT | Mod: CPTII,95,, | Performed by: NURSE PRACTITIONER

## 2023-07-24 PROCEDURE — 99213 OFFICE O/P EST LOW 20 MIN: CPT | Mod: 95,,, | Performed by: NURSE PRACTITIONER

## 2023-07-24 PROCEDURE — 4010F PR ACE/ARB THEARPY RXD/TAKEN: ICD-10-PCS | Mod: CPTII,95,, | Performed by: NURSE PRACTITIONER

## 2023-07-24 PROCEDURE — 1159F PR MEDICATION LIST DOCUMENTED IN MEDICAL RECORD: ICD-10-PCS | Mod: CPTII,95,, | Performed by: NURSE PRACTITIONER

## 2023-07-24 PROCEDURE — 1160F RVW MEDS BY RX/DR IN RCRD: CPT | Mod: CPTII,95,, | Performed by: NURSE PRACTITIONER

## 2023-07-24 PROCEDURE — 3008F PR BODY MASS INDEX (BMI) DOCUMENTED: ICD-10-PCS | Mod: CPTII,95,, | Performed by: NURSE PRACTITIONER

## 2023-07-24 PROCEDURE — 99213 PR OFFICE/OUTPT VISIT, EST, LEVL III, 20-29 MIN: ICD-10-PCS | Mod: 95,,, | Performed by: NURSE PRACTITIONER

## 2023-07-24 RX ORDER — PREGABALIN 150 MG/1
150 CAPSULE ORAL 2 TIMES DAILY
COMMUNITY
Start: 2023-07-08

## 2023-07-24 RX ORDER — URSODIOL 500 MG/1
500 TABLET, FILM COATED ORAL 2 TIMES DAILY
Qty: 180 TABLET | Refills: 3 | Status: SHIPPED | OUTPATIENT
Start: 2023-07-24 | End: 2024-07-23

## 2023-07-24 RX ORDER — TIZANIDINE 4 MG/1
4 TABLET ORAL NIGHTLY
COMMUNITY
Start: 2023-06-30

## 2023-07-24 RX ORDER — METHADONE HYDROCHLORIDE 5 MG/1
10 TABLET ORAL 2 TIMES DAILY
COMMUNITY
End: 2023-07-24

## 2023-07-24 NOTE — PROGRESS NOTES
The patient location is: Home ([place], [place])  The chief complaint leading to consultation is: PBC/Liver Fibrosis    Visit type: audiovisual     Face to Face time with patient: 15  20 minutes of total time spent on the encounter, which includes face to face time and non-face to face time preparing to see the patient (eg, review of tests), Obtaining and/or reviewing separately obtained history, Documenting clinical information in the electronic or other health record, Independently interpreting results (not separately reported) and communicating results to the patient/family/caregiver, or Care coordination (not separately reported).     Each patient to whom he or she provides medical services by telemedicine is:  (1) informed of the relationship between the physician and patient and the respective role of any other health care provider with respect to management of the patient; and (2) notified that he or she may decline to receive medical services by telemedicine and may withdraw from such care at any time.    Notes:     [facility] VISIT ESTABLISHED PT NOTE    REFERRING PROVIDER:  No ref. provider found    CHIEF COMPLAINT: PBC    HPI: This is a 62 y.o. White female with PMH noted below, presenting for follow up. She was last seen in clinic by myself for a virtual visit in 2/2023.    She has a history of fatty liver with stage 3 fibrosis on liver biopsy in 10/2020. The liver biopsy was done during laproscopic cholecystectomy on 10/27/2020, and showed chronic portal inflammation with interface activity, with bridging fibrosis. Serological work up was negative for autoimmune and viral etiologies. Labs continued to show normal liver function, with a persistently elevated alkaline phosphatase (> 400); her transaminases are normal. She also had an incidental finding of hemangiomas on cross sectional imaging.    She underwent MRI of the Abdomen with MRCP in June 2022 for further evaluation of chronically  "elevated Alk Phos. Imaging showed "mild central intrahepatic biliary ductal prominence and mild dilatation of the common hepatic duct, with gradual tapering to normal caliber distal common bile duct", with no evidence of a filling defect or biliary stricture above the level of the ampulla, and no evidence of main pancreatic ductal dilatation. The radiologist recommended further evaluation with ERCP if ampullary stricture is suspected. The MRI also showed 2 small hepatic hemangiomas (1.5 and 1.2 cm, respectively) in segment VII. EUS was done in late July 2022 and showed no gallstones, bile duct strictures or significant pathology in the CBD or pancreatic duct.     Repeat labs in 8/2022 showed an ALP of 760, with normal transaminases and intact synthetic function. ACE level normal. CA 19-9 and AFP were also normal. AMA was positive (127.2), consistent with diagnosis of PBC. Labs also showed that she has not been exposed to, and is not immune to Hepatitis A or B. She has started vaccination series for Hepatitis A and B, and has received 2 vaccinations thus far. She was started on Ursodiol 500 mg PO BID in 8/2022, and has tolerated treatment well.     Follow up labs in September, and then again in December 2022 showed significant improvement in ALP (250's); blood counts, kidney and liver function tests remain normal/stable. She has not had any labs drawn since 12/2022. Per patient report, she has lost 30 lbs over the past 2 years, through dietary changes. BMI is now 22. Last Abdominal US in 12/2022 for HCC surveillance showed no concerning liver lesions. AFP normal. She is well appearing, and has no signs or symptoms of hepatic decompensation including jaundice, dark urine, abdominal distention, lower extremity edema, hematemesis, melena, or periods of confusion suggestive of hepatic encephalopathy.     Review of patient's allergies indicates:  No Known Allergies  Current Outpatient Medications on File Prior to Visit "   Medication Sig Dispense Refill    atorvastatin (LIPITOR) 20 MG tablet Take 20 mg by mouth once daily.      carBAMazepine (CARBATROL) 100 MG CM12 Take 100 mg by mouth 2 (two) times a day.      cyclobenzaprine (FLEXERIL) 10 MG tablet Take 10 mg by mouth once daily.      furosemide (LASIX) 20 MG tablet Take 20 mg by mouth daily as needed.      galcanezumab-gnlm (EMGALITY PEN) 120 mg/mL PnIj 120 mg.      LINZESS 145 mcg Cap capsule Take 145 mcg by mouth.      lisinopriL (PRINIVIL,ZESTRIL) 5 MG tablet Take 5 mg by mouth.      omeprazole (PRILOSEC) 10 MG capsule Take 10 mg by mouth once daily.      oxyCODONE (ROXICODONE) 10 mg Tab immediate release tablet Take 10 mg by mouth 2 (two) times daily as needed.      pregabalin (LYRICA) 100 MG capsule Take 100 mg by mouth 2 (two) times daily.      QUEtiapine (SEROQUEL) 50 MG tablet Take 50 mg by mouth every evening.      ursodioL (ACTIGALL) 500 MG tablet Take 1 tablet (500 mg total) by mouth 2 (two) times daily. 60 tablet 11    zonisamide (ZONEGRAN) 25 MG Cap Take 25 mg by mouth once daily.       No current facility-administered medications on file prior to visit.     PMHX:  has a past medical history of Advanced hepatic fibrosis, Arthritis, Chronic back pain, Fatty liver, Fibromyalgia, GERD (gastroesophageal reflux disease), HTN (hypertension), and Primary biliary cholangitis.    PSHX:  has a past surgical history that includes Laparoscopic cholecystectomy with cholangiography (10/27/2020); Liver biopsy (10/27/2020); Total knee arthroplasty; Endoscopic ultrasound of upper gastrointestinal tract (N/A, 7/29/2022); and ERCP (N/A, 7/29/2022).    FAMILY HISTORY: Negative for liver disease, reviewed in EPIC    SOCIAL HISTORY:   Social History     Tobacco Use   Smoking Status Every Day    Packs/day: 1.00    Types: Cigarettes   Smokeless Tobacco Not on file     Social History     Substance and Sexual Activity   Alcohol Use None     Social History     Substance and Sexual Activity    Drug Use Not on file     ROS:   GENERAL: Denies fever, chills, weight loss/gain, fatigue  HEENT: Denies headaches, dizziness, vision/hearing changes  CARDIOVASCULAR: Denies chest pain, palpitations, or edema  RESPIRATORY: Denies dyspnea, cough  GI: Denies abdominal pain, rectal bleeding, nausea, vomiting. No change in bowel pattern or color  : Denies dysuria, hematuria   SKIN: Denies rash, itching   NEURO: Denies confusion, memory loss, or mood changes  PSYCH: Denies depression or anxiety  HEME/LYMPH: Denies easy bruising or bleeding    PHYSICAL EXAM:   Friendly White female, in no acute distress; alert and oriented to person, place and time.  VITALS: There were no vitals taken for this visit. (Not done - Telehealth visit).  HENT: Normocephalic, without obvious abnormality.   EYES: Sclerae anicteric.  NECK: No obvious masses.  CARDIOVASCULAR: No peripheral edema, per patient report.  RESPIRATORY: Normal respiratory effort.   GI: No abdominal distention per patient report.  EXTREMITIES: Unable to assess.  SKIN: Warm and dry. No jaundice. No rashes noted to exposed skin.  NEURO: Unable to assess.  PSYCH:  Memory intact. Thought and speech pattern appropriate. Behavior normal. No depression or anxiety noted.    DIAGNOSTIC STUDIES:    LIVER BIOPSY 10/27/2020:        MRI ABDOMEN W/W/O CONTRAST WITH MRCP 6/8/2022:    COMPARISON:   No MR or CT comparison.     FINDINGS:     Normal liver morphology without parenchymal signal alteration on in and   opposed phase images.  Subcentimeter cyst located high within the dome   of hepatic segment VIII.  Two adjacent oval well-circumscribed T2   hyperintense masses measuring 1.5 and 1.2 cm respectively located   within hepatic segment VII each show peripheral discontinuous nodular   enhancement, similar to that of blood pool and continuous centripetal   enhancement on portal venous and delayed phases, most compatible with   hemangiomas.  Spleen and adrenal glands are normal.  No  focal MR   abnormality of the pancreas.  Post cholecystectomy.     Mild central intrahepatic biliary ductal prominence and mild dilatation   of the common hepatic duct measuring up to 0.8 cm in greatest diameter.   Gradual tapering to normal caliber distal common bile duct.  No MR   evidence of filling defect or biliary stricture above the level of the   ampulla.  No evidence of main pancreatic ductal dilatation.     Normal renal enhancement without hydronephrosis.  Few small simple   renal cysts.     Nonspecific borderline in size stephanie hepatis lymph node.  No enlarged   lymph nodes within the included abdomen.  No acute vascular abnormality.     No focal MR abnormality of the stomach allowing for limitations related   to relative underdistention.  No MR evidence of acute bowel abnormality   within the included abdomen.  No evidence of free intraperitoneal fluid.    IMPRESSION:   MILD CENTRAL INTRAHEPATIC BILIARY DUCTAL PROMINENCE AND MILD DILATATION   OF THE COMMON HEPATIC DUCT WITH GRADUAL TAPERING TO NORMAL CALIBER   DISTAL COMMON BILE DUCT.  FINDINGS ARE AT LEAST IN PART THOUGHT TO BE   RELATED TO RESERVOIR EFFECT POST CHOLECYSTECTOMY.  IF AMPULLARY   STRICTURE IS SUSPECTED THEN ERCP MAY BE CONSIDERED FOR FURTHER   EVALUATION.     NO MR EVIDENCE OF CHOLEDOCHOLITHIASIS OR BILIARY STRICTURE ABOVE THE   LEVEL OF THE AMPULLA.     TWO ADJACENT SMALL HEMANGIOMAS WITHIN HEPATIC SEGMENT VII.    EUS 7/29/2022:      Findings:        ENDOSONOGRAPHIC FINDING:        The esophagus, stomach and duodenum and adjacent structures were        visualized endosonographically.        Endosonographic imaging in the visualized portion of the liver        showed no lesion.        There was no sign of significant endosonographic abnormality in the        pancreatic head.        There was no sign of significant endosonographic abnormality in the        common bile duct. The maximum diameter of the duct was 6 mm. No        stones, no  biliary sludge, ducts of normal caliber and ducts with        regular contour were identified.   Impression:    - There was no sign of significant pathology in                          the pancreatic head.                          - There was no sign of significant pathology in                          the common bile duct.                          - No EUS evidence for biliary obstruction.                          - No specimens collected.     US ABDOMEN COMPLETE 12/8/2022:    FINDINGS:   The visualized aorta is unremarkable.   The IVC is unremarkable.     The liver measures 13.3 cm in length, normal.  Lobulated echogenic   adjacent lesions are once again demonstrated in segment 7, compatible   with known hemangiomas.  No additional mass is visualized.  Slightly   coarsened echotexture is present without evidence of contour nodularity.     The portal vein and IVC are patent.   There is no free peritoneal fluid within the abdomen detected.     The pancreas visualized has a normal appearance.     The gallbladder is surgically absent.   The common duct measures 3 mm.     The right kidney images demonstrate adequate corticomedullary   differentiation and normal Color Doppler evaluation. The right kidney   measures 9.5 cm in length.     The left kidney images demonstrate adequate corticomedullary   differentiation and normal Color Doppler evaluation. The left kidney   measures 9.4 cm cm in length.     The spleen measures 11 cm.     IMPRESSION:   1. Echogenic lesions within the liver correspond with known   hemangiomas.  There is no new hepatic lesion.   2. Coarsened echotexture of the liver suggests sequela of chronic   hepatocellular disease.  There is no evidence of steatosis or cirrhosis.    ASSESSMENT & PLAN:  62 y.o. White female with:    1. Primary biliary cholangitis  ursodioL (ACTIGALL) 500 MG tablet    AFP Tumor Marker    Comprehensive Metabolic Panel    CBC Auto Differential    Protime-INR    AFP Tumor Marker     Comprehensive Metabolic Panel    CBC Auto Differential    Protime-INR    US Abdomen Complete    US Abdomen Complete    CANCELED: AFP Tumor Marker    CANCELED: Comprehensive Metabolic Panel    CANCELED: CBC Auto Differential    CANCELED: Protime-INR      2. Nonalcoholic fatty liver disease  AFP Tumor Marker    Comprehensive Metabolic Panel    CBC Auto Differential    Protime-INR    AFP Tumor Marker    Comprehensive Metabolic Panel    CBC Auto Differential    Protime-INR    US Abdomen Complete    US Abdomen Complete    CANCELED: AFP Tumor Marker    CANCELED: Comprehensive Metabolic Panel    CANCELED: CBC Auto Differential    CANCELED: Protime-INR      3. Advanced hepatic fibrosis  AFP Tumor Marker    Comprehensive Metabolic Panel    CBC Auto Differential    Protime-INR    AFP Tumor Marker    Comprehensive Metabolic Panel    CBC Auto Differential    Protime-INR    US Abdomen Complete    US Abdomen Complete    CANCELED: AFP Tumor Marker    CANCELED: Comprehensive Metabolic Panel    CANCELED: CBC Auto Differential    CANCELED: Protime-INR      4. Hepatic hemangioma  AFP Tumor Marker    Comprehensive Metabolic Panel    CBC Auto Differential    Protime-INR    AFP Tumor Marker    Comprehensive Metabolic Panel    CBC Auto Differential    Protime-INR    US Abdomen Complete    US Abdomen Complete    CANCELED: AFP Tumor Marker    CANCELED: Comprehensive Metabolic Panel    CANCELED: CBC Auto Differential    CANCELED: Protime-INR      5. Liver fibrosis  ursodioL (ACTIGALL) 500 MG tablet    AFP Tumor Marker    Comprehensive Metabolic Panel    CBC Auto Differential    Protime-INR    AFP Tumor Marker    Comprehensive Metabolic Panel    CBC Auto Differential    Protime-INR        - Continue Ursodiol 500 mg PO BID for the treatment of PBC (refills sent to local pharmacy for 90 day supply).  - Repeat labs now to monitor liver function.  - Recommend abdominal ultrasound, with AFP measurement for HCC surveillance every 6 months, due  now.  - Maintain a healthy weight, through diet and exercise.  - Recommend good control of blood pressure, blood sugar and lipids.   - Avoid alcohol and herbal supplements/alternative remedies.  - Return to clinic to be determined by lab and ultrasound results.    Thank you for allowing me to participate in the care of Desirae Etienne       Hepatology Nurse Practitioner  [facility]    CC'ed note to:   No ref. provider found  Janice Tadeo NP

## 2023-07-25 ENCOUNTER — TELEPHONE (OUTPATIENT)
Dept: HEPATOLOGY | Facility: CLINIC | Age: 63
End: 2023-07-25
Payer: MEDICARE

## 2023-07-25 NOTE — TELEPHONE ENCOUNTER
Returned call to pt, mailing original orders to her confirmed address on file and faxing to [facility] for her to be scheduled for labs and ultrasound. She will not have a follow up appt made as of yet. Mrs Solomon will decide upon results of these tests.  ----- Message from Shavonne Lim RN sent at 7/25/2023  4:38 PM CDT -----  Regarding: FW: Questions    ----- Message -----  From: Pat Alberto  Sent: 7/25/2023   2:57 PM CDT  To: [facility] Clinical Staff  Subject: Questions                                        Pt has questions about her lab orders. She wants to know if she will receive them in the mail.      Desirae @ [phone]

## 2023-08-03 ENCOUNTER — TELEPHONE (OUTPATIENT)
Dept: HEPATOLOGY | Facility: CLINIC | Age: 63
End: 2023-08-03
Payer: MEDICARE

## 2023-08-03 NOTE — TELEPHONE ENCOUNTER
-left a voice message stating GP corin hamiltno has the lab orders ---- Message from Michelle Bishop sent at 8/3/2023 11:32 AM CDT -----  Regarding: Lab Orders  Contact: Desirae  Regarding:Lab Orders    Location:[facility] in [place]          Name Of Caller: Desirae        Contact Preference: [phone] (home)            Nature of call: Pt  is calling to  get lab orders sent over.  Please advise. Requesting a call back

## 2023-08-15 ENCOUNTER — TELEPHONE (OUTPATIENT)
Dept: HEPATOLOGY | Facility: CLINIC | Age: 63
End: 2023-08-15
Payer: MEDICARE

## 2023-08-15 NOTE — TELEPHONE ENCOUNTER
----- Message from Andrew Kline MA sent at 8/15/2023 12:21 PM CDT -----  Regarding: Remail orders  Contact: [phone]  Patient states that her orders still haven't received her orders for her labs and ultrasound. Please call and advise the patient.

## 2023-08-15 NOTE — TELEPHONE ENCOUNTER
Spoke with patient. Patient would like labs and u/s order on hand.  Mailed labs and ultrasound order to patient.

## 2023-10-11 ENCOUNTER — TELEPHONE (OUTPATIENT)
Dept: HEPATOLOGY | Facility: CLINIC | Age: 63
End: 2023-10-11
Payer: MEDICARE

## 2023-10-11 ENCOUNTER — PATIENT MESSAGE (OUTPATIENT)
Dept: HEPATOLOGY | Facility: CLINIC | Age: 63
End: 2023-10-11
Payer: MEDICARE

## 2023-10-11 DIAGNOSIS — K74.02 ADVANCED HEPATIC FIBROSIS: ICD-10-CM

## 2023-10-11 DIAGNOSIS — D18.03 HEPATIC HEMANGIOMA: ICD-10-CM

## 2023-10-11 DIAGNOSIS — K74.3 PRIMARY BILIARY CHOLANGITIS: Primary | ICD-10-CM

## 2023-10-11 DIAGNOSIS — K76.0 NONALCOHOLIC FATTY LIVER DISEASE: ICD-10-CM

## 2023-10-11 NOTE — TELEPHONE ENCOUNTER
Mailing follow up ultrasound and lab orders to be done in January at [facility] before her follow up on 1/26 /2024 in [place]

## 2023-10-11 NOTE — TELEPHONE ENCOUNTER
----- Message from Neha Hall NP sent at 10/11/2023 10:52 AM CDT -----  Please contact patient to arrange a f/u visit with me in [place] in person with labs and US same day. Orders in Epic. Thanks!

## 2024-01-26 ENCOUNTER — OFFICE VISIT (OUTPATIENT)
Dept: HEPATOLOGY | Facility: CLINIC | Age: 64
End: 2024-01-26
Payer: COMMERCIAL

## 2024-01-26 VITALS — HEIGHT: 67 IN | BODY MASS INDEX: 21.63 KG/M2 | WEIGHT: 137.81 LBS

## 2024-01-26 DIAGNOSIS — K74.02 ADVANCED HEPATIC FIBROSIS: ICD-10-CM

## 2024-01-26 DIAGNOSIS — Z85.05 ENCOUNTER FOR FOLLOW-UP SURVEILLANCE OF LIVER CANCER: ICD-10-CM

## 2024-01-26 DIAGNOSIS — Z08 ENCOUNTER FOR FOLLOW-UP SURVEILLANCE OF LIVER CANCER: ICD-10-CM

## 2024-01-26 DIAGNOSIS — K76.0 NONALCOHOLIC FATTY LIVER: ICD-10-CM

## 2024-01-26 DIAGNOSIS — I10 HYPERTENSION, UNSPECIFIED TYPE: ICD-10-CM

## 2024-01-26 DIAGNOSIS — K52.9 CHRONIC DIARRHEA: ICD-10-CM

## 2024-01-26 DIAGNOSIS — E78.5 HYPERLIPIDEMIA, UNSPECIFIED HYPERLIPIDEMIA TYPE: ICD-10-CM

## 2024-01-26 DIAGNOSIS — D18.03 HEPATIC HEMANGIOMA: ICD-10-CM

## 2024-01-26 DIAGNOSIS — L29.9 PRURITUS: ICD-10-CM

## 2024-01-26 DIAGNOSIS — K21.9 GASTROESOPHAGEAL REFLUX DISEASE, UNSPECIFIED WHETHER ESOPHAGITIS PRESENT: ICD-10-CM

## 2024-01-26 DIAGNOSIS — K74.3 PRIMARY BILIARY CHOLANGITIS: Primary | ICD-10-CM

## 2024-01-26 PROBLEM — M54.2 NECK PAIN: Status: ACTIVE | Noted: 2023-02-03

## 2024-01-26 PROBLEM — H61.22 LEFT EAR IMPACTED CERUMEN: Status: ACTIVE | Noted: 2019-03-19

## 2024-01-26 PROBLEM — K59.09 CHRONIC CONSTIPATION: Status: ACTIVE | Noted: 2020-05-19

## 2024-01-26 PROCEDURE — 4010F ACE/ARB THERAPY RXD/TAKEN: CPT | Mod: CPTII,S$GLB,, | Performed by: NURSE PRACTITIONER

## 2024-01-26 PROCEDURE — 1160F RVW MEDS BY RX/DR IN RCRD: CPT | Mod: CPTII,S$GLB,, | Performed by: NURSE PRACTITIONER

## 2024-01-26 PROCEDURE — 1159F MED LIST DOCD IN RCRD: CPT | Mod: CPTII,S$GLB,, | Performed by: NURSE PRACTITIONER

## 2024-01-26 PROCEDURE — 99214 OFFICE O/P EST MOD 30 MIN: CPT | Mod: S$GLB,,, | Performed by: NURSE PRACTITIONER

## 2024-01-26 PROCEDURE — 99999 PR PBB SHADOW E&M-EST. PATIENT-LVL V: CPT | Mod: PBBFAC,,, | Performed by: NURSE PRACTITIONER

## 2024-01-26 PROCEDURE — 3008F BODY MASS INDEX DOCD: CPT | Mod: CPTII,S$GLB,, | Performed by: NURSE PRACTITIONER

## 2024-01-26 RX ORDER — HYDROXYZINE PAMOATE 25 MG/1
25 CAPSULE ORAL EVERY 8 HOURS PRN
Qty: 90 CAPSULE | Refills: 3 | Status: SHIPPED | OUTPATIENT
Start: 2024-01-26

## 2024-01-26 NOTE — PATIENT INSTRUCTIONS
Try chilled Sarna lotion for itching. Referral also placed to Dermatology.  Try sugar free hard candies for dry mouth, along with Biotene mouth wash.  Referral placed to GI for ongoing management of chronic GI issues.  RX sent for Hydroxyzine to your local pharmacy to be used as needed for itching.

## 2024-01-26 NOTE — PROGRESS NOTES
"[facility] VISIT ESTABLISHED PT NOTE    REFERRING PROVIDER:  No ref. provider found    CHIEF COMPLAINT: PBC    HPI: This is a 63 y.o. White female with PMH noted below, presenting for follow up. She was last seen in clinic by myself for a virtual visit in 7/2023.    She has a history of fatty liver with stage 3 fibrosis noted on liver biopsy in 10/2020 (no hepatic steatosis noted on path report). The liver biopsy was done during laproscopic cholecystectomy on 10/27/2020, and showed chronic portal inflammation with interface activity, with bridging fibrosis. Serological work up was negative for autoimmune and viral etiologies. Labs continued to show normal liver function, with a persistently elevated alkaline phosphatase (> 400); her transaminases are normal. She also had an incidental finding of hemangiomas on cross sectional imaging.    She underwent MRI of the Abdomen with MRCP in June 2022 for further evaluation of chronically elevated Alk Phos. Imaging showed "mild central intrahepatic biliary ductal prominence and mild dilatation of the common hepatic duct, with gradual tapering to normal caliber distal common bile duct", with no evidence of a filling defect or biliary stricture above the level of the ampulla, and no evidence of main pancreatic ductal dilatation. The radiologist recommended further evaluation with ERCP if ampullary stricture is suspected. The MRI also showed 2 small hepatic hemangiomas (1.5 and 1.2 cm, respectively) in segment VII. EUS was done in late July 2022 and showed no gallstones, bile duct strictures or significant pathology in the CBD or pancreatic duct.     Repeat labs in 8/2022 showed an ALP of 760, with normal transaminases and intact synthetic function. ACE level normal. CA 19-9 and AFP were also normal. AMA was positive (127.2), consistent with diagnosis of PBC. Labs also showed that she has not been exposed to, and is not immune to Hepatitis A or B. She has started " vaccination series for Hepatitis A and B, and has received 2 vaccinations thus far. She was started on Ursodiol 500 mg PO BID in 8/2022, and has tolerated treatment well.     Follow up labs in September, and then again in December 2022 showed significant improvement in ALP (250's); blood counts, kidney and liver function tests remain normal/stable. Most recent LFT's in 12/2023 showed an ALP of 186. Per patient report, she has lost 30 lbs over the past 2 years, through dietary changes. BMI is now 21. B/P and lipids are well controlled on current regimen. Last Abdominal US in 12/2023 for HCC surveillance showed no concerning liver lesions, as below:    FINDINGS:     Aorta: Atherosclerosis without aneurysm.   Inferior vena cava: Normal.     Liver: Measures 13 cm.  Normal echogenicity and echotexture.  2   adjacent 1.6 cm echogenic lesions are identified in the right liver,   unchanged, and correspond to previously identified hemangiomas.     Free fluid: None.     Portal vein: Patent with normal waveform and hepatopetal flow.     Pancreas: Imaged portions are within normal limits.     Gallbladder: Absent.   Sonographic Grant sign: Negative/absent.     Common bile duct: Measures 0.7 cm. No evidence of choledocholithiasis.   No intrahepatic ductal dilation.     Right kidney: Measures 9.5 cm.  Normal corticomedullary distinction and   color flow. Small nonobstructing nephrolith.  No hydronephrosis.     Left kidney: Measures 8.9 cm.  Normal corticomedullary distinction and   color flow. Small nonobstructing nephrolith.  No hydronephrosis.     Spleen: Measures 7.6 cm.  Normal echogenicity.     Other: None.      AFP remains normal. She is well appearing, and has no signs or symptoms of hepatic decompensation including jaundice, dark urine, abdominal distention, lower extremity edema, hematemesis, melena, or periods of confusion suggestive of hepatic encephalopathy. Further ROS as below.     Review of patient's allergies  indicates:  No Known Allergies  Current Outpatient Medications on File Prior to Visit   Medication Sig Dispense Refill    atorvastatin (LIPITOR) 20 MG tablet Take 20 mg by mouth once daily.      carBAMazepine (CARBATROL) 100 MG CM12 Take 100 mg by mouth 2 (two) times a day.      furosemide (LASIX) 20 MG tablet Take 20 mg by mouth daily as needed.      galcanezumab-gnlm (EMGALITY PEN) 120 mg/mL PnIj 120 mg.      LINZESS 145 mcg Cap capsule Take 145 mcg by mouth.      lisinopriL (PRINIVIL,ZESTRIL) 5 MG tablet Take 5 mg by mouth.      omeprazole (PRILOSEC) 10 MG capsule Take 10 mg by mouth once daily.      oxyCODONE (ROXICODONE) 10 mg Tab immediate release tablet Take 10 mg by mouth 2 (two) times daily as needed.      pregabalin (LYRICA) 150 MG capsule Take 150 mg by mouth 2 (two) times daily.      QUEtiapine (SEROQUEL) 50 MG tablet Take 50 mg by mouth every evening.      tiZANidine (ZANAFLEX) 4 MG tablet Take 4 mg by mouth every evening.      ursodioL (ACTIGALL) 500 MG tablet Take 1 tablet (500 mg total) by mouth 2 (two) times daily. 180 tablet 3    zonisamide (ZONEGRAN) 25 MG Cap Take 25 mg by mouth once daily.       No current facility-administered medications on file prior to visit.     PMHX:  has a past medical history of Advanced hepatic fibrosis, Arthritis, Chronic back pain, Fatty liver, Fibromyalgia, GERD (gastroesophageal reflux disease), HTN (hypertension), and Primary biliary cholangitis.    PSHX:  has a past surgical history that includes Laparoscopic cholecystectomy with cholangiography (10/27/2020); Liver biopsy (10/27/2020); Total knee arthroplasty; Endoscopic ultrasound of upper gastrointestinal tract (N/A, 7/29/2022); and ERCP (N/A, 7/29/2022).    FAMILY HISTORY: Negative for liver disease, reviewed in [facility]    SOCIAL HISTORY:   Social History     Tobacco Use   Smoking Status Every Day    Current packs/day: 1.00    Types: Cigarettes   Smokeless Tobacco Not on file     Social History     Substance and  "Sexual Activity   Alcohol Use None     Social History     Substance and Sexual Activity   Drug Use Not on file     ROS:   GENERAL: Denies fever, chills, weight loss/gain, + fatigue + dry mouth  HEENT: Denies headaches, dizziness, vision/hearing changes  CARDIOVASCULAR: Denies chest pain, palpitations, or edema  RESPIRATORY: Denies dyspnea, cough  GI: + Chronic GI issues, including GERD, diarrhea, constipation - referral to GI placed.  : Denies dysuria, hematuria   SKIN: Denies rash, + itching - RX for Hydroxyzine sent to local pharmacy and dermatology referral placed.  NEURO: Denies confusion, memory loss, or mood changes  PSYCH: Denies depression or anxiety  HEME/LYMPH: Denies easy bruising or bleeding    PHYSICAL EXAM:   Friendly White female, in no acute distress; alert and oriented to person, place and time.  VITALS: Ht 5' 7" (1.702 m)   Wt 62.5 kg (137 lb 12.6 oz)   BMI 21.58 kg/m²    HENT: Normocephalic, without obvious abnormality.   EYES: Sclerae anicteric.  NECK: No obvious masses.  CARDIOVASCULAR: No peripheral edema.  RESPIRATORY: Normal respiratory effort.   GI: No abdominal distention.  EXTREMITIES: No peripheral edema.  SKIN: Warm and dry. No jaundice. No rashes noted to exposed skin.  NEURO: Normal gait. No asterixis.  PSYCH:  Memory intact. Thought and speech pattern appropriate. Behavior normal. No depression or anxiety noted.    DIAGNOSTIC STUDIES:    LIVER BIOPSY 10/27/2020:        MRI ABDOMEN W/W/O CONTRAST WITH MRCP 6/8/2022:    COMPARISON:   No MR or CT comparison.     FINDINGS:     Normal liver morphology without parenchymal signal alteration on in and   opposed phase images.  Subcentimeter cyst located high within the dome   of hepatic segment VIII.  Two adjacent oval well-circumscribed T2   hyperintense masses measuring 1.5 and 1.2 cm respectively located   within hepatic segment VII each show peripheral discontinuous nodular   enhancement, similar to that of blood pool and continuous " centripetal   enhancement on portal venous and delayed phases, most compatible with   hemangiomas.  Spleen and adrenal glands are normal.  No focal MR   abnormality of the pancreas.  Post cholecystectomy.     Mild central intrahepatic biliary ductal prominence and mild dilatation   of the common hepatic duct measuring up to 0.8 cm in greatest diameter.   Gradual tapering to normal caliber distal common bile duct.  No MR   evidence of filling defect or biliary stricture above the level of the   ampulla.  No evidence of main pancreatic ductal dilatation.     Normal renal enhancement without hydronephrosis.  Few small simple   renal cysts.     Nonspecific borderline in size stephanie hepatis lymph node.  No enlarged   lymph nodes within the included abdomen.  No acute vascular abnormality.     No focal MR abnormality of the stomach allowing for limitations related   to relative underdistention.  No MR evidence of acute bowel abnormality   within the included abdomen.  No evidence of free intraperitoneal fluid.    IMPRESSION:   MILD CENTRAL INTRAHEPATIC BILIARY DUCTAL PROMINENCE AND MILD DILATATION   OF THE COMMON HEPATIC DUCT WITH GRADUAL TAPERING TO NORMAL CALIBER   DISTAL COMMON BILE DUCT.  FINDINGS ARE AT LEAST IN PART THOUGHT TO BE   RELATED TO RESERVOIR EFFECT POST CHOLECYSTECTOMY.  IF AMPULLARY   STRICTURE IS SUSPECTED THEN ERCP MAY BE CONSIDERED FOR FURTHER   EVALUATION.     NO MR EVIDENCE OF CHOLEDOCHOLITHIASIS OR BILIARY STRICTURE ABOVE THE   LEVEL OF THE AMPULLA.     TWO ADJACENT SMALL HEMANGIOMAS WITHIN HEPATIC SEGMENT VII.    EUS 7/29/2022:      Findings:        ENDOSONOGRAPHIC FINDING:        The esophagus, stomach and duodenum and adjacent structures were        visualized endosonographically.        Endosonographic imaging in the visualized portion of the liver        showed no lesion.        There was no sign of significant endosonographic abnormality in the        pancreatic head.        There was no sign  of significant endosonographic abnormality in the        common bile duct. The maximum diameter of the duct was 6 mm. No        stones, no biliary sludge, ducts of normal caliber and ducts with        regular contour were identified.   Impression:    - There was no sign of significant pathology in                          the pancreatic head.                          - There was no sign of significant pathology in                          the common bile duct.                          - No EUS evidence for biliary obstruction.                          - No specimens collected.     US ABDOMEN COMPLETE 12/8/2022:    FINDINGS:   The visualized aorta is unremarkable.   The IVC is unremarkable.     The liver measures 13.3 cm in length, normal.  Lobulated echogenic   adjacent lesions are once again demonstrated in segment 7, compatible   with known hemangiomas.  No additional mass is visualized.  Slightly   coarsened echotexture is present without evidence of contour nodularity.     The portal vein and IVC are patent.   There is no free peritoneal fluid within the abdomen detected.     The pancreas visualized has a normal appearance.     The gallbladder is surgically absent.   The common duct measures 3 mm.     The right kidney images demonstrate adequate corticomedullary   differentiation and normal Color Doppler evaluation. The right kidney   measures 9.5 cm in length.     The left kidney images demonstrate adequate corticomedullary   differentiation and normal Color Doppler evaluation. The left kidney   measures 9.4 cm cm in length.     The spleen measures 11 cm.     IMPRESSION:   1. Echogenic lesions within the liver correspond with known   hemangiomas.  There is no new hepatic lesion.   2. Coarsened echotexture of the liver suggests sequela of chronic   hepatocellular disease.  There is no evidence of steatosis or cirrhosis.    US ABDOMEN 10/10/2023:    FINDINGS:     LIVER:   Size: 13.5 cm at midclavicular line    Echogenicity: Mildly coarsened   Surface nodularity: None   Mass: There are 2 closely adjacent echogenic lesions within the right   lobe of the liver with an aggregate diameter of 2 cm.  No new liver   lesion is noted.   Portal vein: Normal     GALLBLADDER:  Surgically absent   Stones:  None       BILE DUCTS:   Intrahepatic ducts: Normal   Extrahepatic ducts:  4 mm     PANCREAS:   Head and uncinate process: Normal   Body and tail: Normal     SPLEEN:   10 cm     RIGHT KIDNEY: 3 cm simple cyst   Length:  10 cm   Hydronephrosis:  None     LEFT KIDNEY: 2 cm simple cyst   Length:  9.5 cm   Hydronephrosis:  None     AORTA AND IVC:  Visualized segments are normal     Ascites:  None     US ABDOMEN 12/20/2023:    FINDINGS:     Aorta: Atherosclerosis without aneurysm.   Inferior vena cava: Normal.     Liver: Measures 13 cm.  Normal echogenicity and echotexture.  2   adjacent 1.6 cm echogenic lesions are identified in the right liver,   unchanged, and correspond to previously identified hemangiomas.     Free fluid: None.     Portal vein: Patent with normal waveform and hepatopetal flow.     Pancreas: Imaged portions are within normal limits.     Gallbladder: Absent.   Sonographic Grant sign: Negative/absent.     Common bile duct: Measures 0.7 cm. No evidence of choledocholithiasis.   No intrahepatic ductal dilation.     Right kidney: Measures 9.5 cm.  Normal corticomedullary distinction and   color flow. Small nonobstructing nephrolith.  No hydronephrosis.     Left kidney: Measures 8.9 cm.  Normal corticomedullary distinction and   color flow. Small nonobstructing nephrolith.  No hydronephrosis.     Spleen: Measures 7.6 cm.  Normal echogenicity.     Other: None.     ASSESSMENT & PLAN:  63 y.o. White female with:    1. Primary biliary cholangitis  Ambulatory referral/consult to Dermatology    AFP Tumor Marker    Comprehensive Metabolic Panel    CBC Auto Differential    Protime-INR    US Abdomen Complete    AFP Tumor Marker     Comprehensive Metabolic Panel    CBC Auto Differential    Protime-INR    US Abdomen Complete      2. Advanced hepatic fibrosis  AFP Tumor Marker    Comprehensive Metabolic Panel    CBC Auto Differential    Protime-INR    US Abdomen Complete    AFP Tumor Marker    Comprehensive Metabolic Panel    CBC Auto Differential    Protime-INR    US Abdomen Complete      3. Pruritus  Ambulatory referral/consult to Dermatology    hydrOXYzine pamoate (VISTARIL) 25 MG Cap    AFP Tumor Marker    Comprehensive Metabolic Panel    CBC Auto Differential    Protime-INR    US Abdomen Complete    AFP Tumor Marker    Comprehensive Metabolic Panel    CBC Auto Differential    Protime-INR    US Abdomen Complete      4. Nonalcoholic fatty liver  AFP Tumor Marker    Comprehensive Metabolic Panel    CBC Auto Differential    Protime-INR    US Abdomen Complete    AFP Tumor Marker    Comprehensive Metabolic Panel    CBC Auto Differential    Protime-INR    US Abdomen Complete      5. Hepatic hemangioma  AFP Tumor Marker    Comprehensive Metabolic Panel    CBC Auto Differential    Protime-INR    US Abdomen Complete    AFP Tumor Marker    Comprehensive Metabolic Panel    CBC Auto Differential    Protime-INR    US Abdomen Complete      6. Hyperlipidemia, unspecified hyperlipidemia type  AFP Tumor Marker    Comprehensive Metabolic Panel    CBC Auto Differential    Protime-INR    US Abdomen Complete    AFP Tumor Marker    Comprehensive Metabolic Panel    CBC Auto Differential    Protime-INR    US Abdomen Complete      7. Hypertension, unspecified type  AFP Tumor Marker    Comprehensive Metabolic Panel    CBC Auto Differential    Protime-INR    US Abdomen Complete    AFP Tumor Marker    Comprehensive Metabolic Panel    CBC Auto Differential    Protime-INR    US Abdomen Complete      8. Gastroesophageal reflux disease, unspecified whether esophagitis present  Ambulatory referral/consult to Gastroenterology    AFP Tumor Marker    Comprehensive Metabolic  Panel    CBC Auto Differential    Protime-INR    US Abdomen Complete    AFP Tumor Marker    Comprehensive Metabolic Panel    CBC Auto Differential    Protime-INR    US Abdomen Complete      9. Chronic diarrhea  Ambulatory referral/consult to Gastroenterology    AFP Tumor Marker    Comprehensive Metabolic Panel    CBC Auto Differential    Protime-INR    US Abdomen Complete    AFP Tumor Marker    Comprehensive Metabolic Panel    CBC Auto Differential    Protime-INR    US Abdomen Complete        - Continue Ursodiol 500 mg PO BID for the treatment of PBC.  - Repeat labs in 6 months to monitor liver function.  - If ALP remains above 135, will add on adjunctive treatment of Ocaliva 5 mg PO daily.  - Recommend abdominal ultrasound, with AFP measurement for HCC surveillance every 6 months, next due 6/2024.  - Recommend TSH and lipids be checked annually through PCP.  - Recommend DEXA scan every 2-3 years to monitor BMD.  - Maintain a healthy weight, through diet and exercise.  - Recommend good control of blood pressure, blood sugar and lipids.   - Avoid alcohol and herbal supplements/alternative remedies.  - RX for Hydroxyzine sent to local pharmacy to be used PRN for itching.   - Referrals placed to GI and Dermatology as requested.   - Return to clinic in 6 months.    Thank you for allowing me to participate in the care of Desirae Etienne       Hepatology Nurse Practitioner  [facility]    CC'ed note to:   No ref. provider found  No primary care provider on file.

## 2024-02-08 NOTE — PROGRESS NOTES
Subjective:       Patient ID: Desirae Etienne is a 63 y.o. female Body mass index is 21.51 kg/m².    Chief Complaint: No chief complaint on file.    This patient is new to me.  Referring Provider: Neha Hall for constipation.       GI Problem  The primary symptoms include abdominal pain (chronic RLQ pain, occurs with constipation is severe and has skipped days without having a bowel movement). Primary symptoms do not include fever, weight loss, fatigue, nausea, vomiting, diarrhea, melena, hematemesis, jaundice, hematochezia, dysuria, myalgias, arthralgias or rash.   Onset: Pain occurs daily. Progression: feels like a crampy dull. The abdominal pain is located in the RLQ and LLQ. The abdominal pain does not radiate. The severity of the abdominal pain is 7/10. The abdominal pain is relieved by bowel movements (Pain improves after having good bowel movement).   The illness is also significant for dysphagia (chronic dypshagia with meats, breads, pills, feels like gets stuck in throat eventually makes its way down no issues liquids, occurs occasionally) and constipation (hx of chronic constipation, takes daily softner with daily linzess 145mcg, has 2-3bms/week, strains, does not feel empty, rates stool type 1 on bristol stool scale reports excess gas,, states takes chronic opioids daily for pain). The illness does not include bloating. Associated symptoms comments: Patient referred by hepatology for chronic constipation and GERD, has not had a colonoscopy in the past, states that she does not have any family history of colon cancer or IBD. Significant associated medical issues include GERD and liver disease (Patient followed by hepatology for JOSHI, primary biliary cholangitis, advanced hepatic fibrosis). Associated medical issues do not include inflammatory bowel disease, gallstones, alcohol abuse, PUD, gastric bypass, bowel resection, irritable bowel syndrome, hemorrhoids or diverticulitis.    Gastroesophageal Reflux  She complains of abdominal pain (chronic RLQ pain, occurs with constipation is severe and has skipped days without having a bowel movement), belching, dysphagia (chronic dypshagia with meats, breads, pills, feels like gets stuck in throat eventually makes its way down no issues liquids, occurs occasionally), heartburn and water brash. She reports no chest pain, no choking, no coughing, no early satiety, no globus sensation, no hoarse voice or no nausea. This is a chronic problem. The current episode started more than 1 year ago. The problem occurs frequently. The problem has been waxing and waning. The heartburn is located in the substernum. The heartburn wakes her from sleep. The heartburn does not limit her activity. The heartburn doesn't change with position. Pertinent negatives include no anemia, fatigue, melena, muscle weakness, orthopnea or weight loss. Risk factors include smoking/tobacco exposure. She has tried a PPI (takes omeprazole 10mg orally daily) for the symptoms. The treatment provided moderate relief. Past procedures do not include an abdominal ultrasound, an EGD (Has not had an EGD in the past), esophageal manometry, esophageal pH monitoring, H. pylori antibody titer or a UGI. Past invasive treatments do not include gastroplasty, gastroplication or reflux surgery.       Review of Systems   Constitutional:  Negative for fatigue, fever and weight loss.   HENT:  Negative for hoarse voice.    Respiratory:  Negative for cough and choking.    Cardiovascular:  Negative for chest pain.   Gastrointestinal:  Positive for abdominal pain (chronic RLQ pain, occurs with constipation is severe and has skipped days without having a bowel movement), constipation (hx of chronic constipation, takes daily softner with daily linzess 145mcg, has 2-3bms/week, strains, does not feel empty, rates stool type 1 on bristol stool scale reports excess gas,, states takes chronic opioids daily for  pain), dysphagia (chronic dypshagia with meats, breads, pills, feels like gets stuck in throat eventually makes its way down no issues liquids, occurs occasionally) and heartburn. Negative for bloating, diarrhea, hematemesis, hematochezia, jaundice, melena, nausea and vomiting.   Genitourinary:  Negative for dysuria.   Musculoskeletal:  Negative for arthralgias, myalgias and muscle weakness.   Skin:  Negative for rash.         No LMP recorded. Patient has had a hysterectomy.  Past Medical History:   Diagnosis Date    Advanced hepatic fibrosis     Arthritis     Chronic back pain     Fatty liver     Fibromyalgia     GERD (gastroesophageal reflux disease)     HTN (hypertension)     Primary biliary cholangitis      Past Surgical History:   Procedure Laterality Date    APPENDECTOMY      CHOLECYSTECTOMY      ENDOSCOPIC ULTRASOUND OF UPPER GASTROINTESTINAL TRACT N/A 07/29/2022    Procedure: ULTRASOUND, UPPER GI TRACT, ENDOSCOPIC;  Surgeon: Favian Valdes MD;  Location: [facility] ([address] [place] [address]);  Service: Endoscopy;  Laterality: N/A;  From: Vladimir Pandya MD   Sent: 6/30/2022   2:37 PM CDT   To: Yuliana Montero MA   EUS +/- ERCP     Pt is fully vaccinated-DS  7/6/22-Instructions sent via portal-DS  7/22/22-Pt's  confirmed new arrival time of 10:00am-DS  7/28/22-Unable     ERCP N/A 07/29/2022    Procedure: ERCP (ENDOSCOPIC RETROGRADE CHOLANGIOPANCREATOGRAPHY);  Surgeon: Favian Valdes MD;  Location: [address]);  Service: Endoscopy;  Laterality: N/A;  From: Vladimir Pandya MD   Sent: 6/30/2022   2:37 PM CDT   To: Yuliana Motnero MA     EUS +/- ERCP       LAPAROSCOPIC CHOLECYSTECTOMY WITH CHOLANGIOGRAPHY  10/27/2020    LIVER BIOPSY  10/27/2020    TOTAL KNEE ARTHROPLASTY       Family History   Problem Relation Age of Onset    Colon cancer Paternal Uncle     Crohn's disease Neg Hx     Liver cancer Neg Hx     Liver disease Neg Hx      Social History     Tobacco Use    Smoking status: Every Day     Current  packs/day: 1.00     Types: Cigarettes     Wt Readings from Last 10 Encounters:   02/09/24 62.3 kg (137 lb 5.6 oz)   01/26/24 62.5 kg (137 lb 12.6 oz)   07/24/23 64 kg (141 lb)   07/29/22 70.3 kg (155 lb)   11/03/21 77.7 kg (171 lb 4.8 oz)   04/29/21 85 kg (187 lb 6.3 oz)     Lab Results   Component Value Date    WBC 14.31 (H) 11/03/2021    HGB 14.9 11/03/2021    HCT 46.3 11/03/2021     (H) 11/03/2021     11/03/2021     CMP  Sodium   Date Value Ref Range Status   09/26/2022 137 136 - 145 mmol/L Final   11/03/2021 141 136 - 145 mmol/L Final     Potassium   Date Value Ref Range Status   09/26/2022 4.9 3.5 - 5.1 mmol/L Final   11/03/2021 4.6 3.5 - 5.1 mmol/L Final     Chloride   Date Value Ref Range Status   09/26/2022 104 98 - 107 mmol/L Final   11/03/2021 104 95 - 110 mmol/L Final     CO2   Date Value Ref Range Status   09/26/2022 33 (H) 21 - 32 mmol/L Final   11/03/2021 26 23 - 29 mmol/L Final     Glucose   Date Value Ref Range Status   11/03/2021 74 70 - 110 mg/dL Final     BUN   Date Value Ref Range Status   09/26/2022 11 7 - 18 mg/dL Final   11/03/2021 13 8 - 23 mg/dL Final     Creatinine   Date Value Ref Range Status   09/26/2022 0.82 0.55 - 1.02 mg/dL Final   11/03/2021 1.2 0.5 - 1.4 mg/dL Final     Calcium   Date Value Ref Range Status   09/26/2022 9.6 8.5 - 10.1 mg/dL Final   11/03/2021 10.0 8.7 - 10.5 mg/dL Final     Total Protein   Date Value Ref Range Status   11/03/2021 7.2 6.0 - 8.4 g/dL Final     Albumin   Date Value Ref Range Status   11/03/2021 3.8 3.5 - 5.2 g/dL Final     Albumin Level   Date Value Ref Range Status   09/26/2022 3.1 (L) 3.4 - 5.0 g/dL Final     Total Bilirubin   Date Value Ref Range Status   11/03/2021 0.5 0.1 - 1.0 mg/dL Final     Comment:     For infants and newborns, interpretation of results should be based  on gestational age, weight and in agreement with clinical  observations.    Premature Infant recommended reference ranges:  Up to 24 hours.............<8.0  "mg/dL  Up to 48 hours............<12.0 mg/dL  3-5 days..................<15.0 mg/dL  6-29 days.................<15.0 mg/dL       Alkaline Phosphatase   Date Value Ref Range Status   11/03/2021 385 (H) 55 - 135 U/L Final     Alk Phos   Date Value Ref Range Status   09/26/2022 256 (H) 45 - 117 IU/L Final     AST   Date Value Ref Range Status   11/03/2021 27 10 - 40 U/L Final     Aspartate Aminotransferase   Date Value Ref Range Status   09/26/2022 14 (L) 15 - 37 IU/L Final     ALT   Date Value Ref Range Status   11/03/2021 17 10 - 44 U/L Final     Alanine Aminotransferase   Date Value Ref Range Status   09/26/2022 13 13 - 56 IU/L Final     Anion Gap   Date Value Ref Range Status   11/03/2021 11 8 - 16 mmol/L Final     eGFR if    Date Value Ref Range Status   11/03/2021 56.4 (A) >60 mL/min/1.73 m^2 Final     eGFR    Date Value Ref Range Status   09/26/2022 >60 >60 mL/min/1.73m2 Final     eGFR if non    Date Value Ref Range Status   11/03/2021 48.9 (A) >60 mL/min/1.73 m^2 Final     Comment:     Calculation used to obtain the estimated glomerular filtration  rate (eGFR) is the CKD-EPI equation.        eGFR   Date Value Ref Range Status   09/26/2022 >60 >60 mL/min/1.73m2 Final     No results found for: "LIPASE"  No results found for: "LIPASERES"  No results found for: "TSH"    Reviewed prior medical records including BLANCA Erwin office visit 01/26/2024 radiology report of ultrasound abdomen 12/20/2023 & endoscopy history (see surgical history/procedures).    Objective:      Physical Exam  Vitals and nursing note reviewed.   Constitutional:       Appearance: Normal appearance. She is normal weight.   Cardiovascular:      Rate and Rhythm: Normal rate and regular rhythm.      Heart sounds: Normal heart sounds.   Pulmonary:      Breath sounds: Normal breath sounds.   Abdominal:      General: Bowel sounds are normal.      Palpations: Abdomen is soft.      Tenderness: There is no " abdominal tenderness.   Skin:     General: Skin is warm and dry.      Coloration: Skin is not jaundiced.   Neurological:      Mental Status: She is alert and oriented to person, place, and time.   Psychiatric:         Mood and Affect: Mood normal.         Behavior: Behavior normal.         Assessment:       1. Chronic constipation    2. Chronic, continuous use of opioids    3. Lower abdominal pain    4. Gastroesophageal reflux disease, unspecified whether esophagitis present    5. Belching    6. Heartburn    7. Waterbrash    8. Dysphagia, unspecified type    9. Primary biliary cholangitis    10. Nonalcoholic fatty liver    11. Advanced hepatic fibrosis        Plan:       Chronic constipation  -  START   linaCLOtide (LINZESS) 290 mcg Cap capsule; Take 1 capsule (290 mcg total) by mouth before breakfast.  Dispense: 90 capsule; Refill: 0  -     Case Request Endoscopy: COLONOSCOPY  - schedule Colonoscopy, discussed procedure with the patient, including risks and benefits, patient verbalized understanding  -Consider Amitiza, Relistor, or Motegrity for constipation if max dose Linzess does not help  -Recommend daily exercise as tolerated, adequate water intake (six 8-oz glasses of water daily), and high fiber diet. OTC fiber supplements are recommended if diet does not reach daily fiber goal (20-30 grams daily), such as Metamucil, Citrucel, or FiberCon (take as directed, separate from other oral medications by >2 hours).  -Recommend trying OTC MiraLax once daily (17g PO) as directed  -If still no improvement with these measures, call/follow-up    Chronic, continuous use of opioids  -     follow constipation regimen    Lower abdominal pain  -     Case Request Endoscopy: COLONOSCOPY  - schedule Colonoscopy, discussed procedure with the patient, including risks and benefits, patient verbalized understanding  -if pain does not improve with constipation regimen we will order CT of abdomen    Gastroesophageal reflux disease,  unspecified whether esophagitis present, belching, heartburn, water brash  -     Ambulatory referral/consult to Gastroenterology  -     Case Request Endoscopy: EGD (ESOPHAGOGASTRODUODENOSCOPY)  -   START  omeprazole (PRILOSEC) 40 MG capsule; Take 1 capsule (40 mg total) by mouth once daily.  Dispense: 90 capsule; Refill: 1   -Take PPI 30min-1hr before eating breakfast  -Educated patient on lifestyle modifications to help control/reduce reflux/abdominal pain including: avoid large meals, avoid eating within 2-3 hours of bedtime (avoid late night eating & lying down soon after eating), elevate head of bed if nocturnal symptoms are present, smoking cessation (if current smoker), & weight loss (if overweight).   -Educated to avoid known foods which trigger reflux symptoms & to minimize/avoid high-fat foods, chocolate, caffeine, citrus, alcohol, & tomato products.  -Advised to avoid/limit use of NSAID's, since they can cause GI upset, bleeding, and/or ulcers. If needed, take with food.     Dysphagia, unspecified type  -     Case Request Endoscopy: EGD (ESOPHAGOGASTRODUODENOSCOPY)  - schedule EGD, discussed procedure with patient and possible esophageal dilation may be performed during procedure if indicated, patient verbalized understanding  - educated patient to eat smaller more frequent meals and to eat slowly and advised to eat a soft diet.  - possible UGI/esophagram/esophageal manometry if symptoms persist    Primary biliary cholangitis, Nonalcoholic fatty liver, Advanced hepatic fibrosis   -continue follow up with hepatology   For fatty liver recommend: low fat, low cholesterol diet, maintain good control of blood sugars and cholesterol levels, exercise, weight loss (if overweight), minimize/avoid alcohol and tylenol products, & follow-up with PCP for continued evaluation and management; if specialist is needed, recommend seeing hepatology.          Follow up if symptoms worsen or fail to improve.      If no  improvement in symptoms or symptoms worsen, call/follow-up at clinic or go to ER.       [facility] - GASTROENTEROLOGY  [facility], [place]     Dictation software program was used for this note. Please expect some simple typographical  errors in this note.    Encounter includes face to face time and non-face to face time preparing to see the patient (eg, review of tests), obtaining and/or reviewing separately obtained history, documenting clinical information in the electronic or other health record, independently interpreting results (not separately reported) and communicating results to the patient/family/caregiver, or care coordination (not separately reported).

## 2024-02-09 ENCOUNTER — OFFICE VISIT (OUTPATIENT)
Dept: GASTROENTEROLOGY | Facility: CLINIC | Age: 64
End: 2024-02-09
Payer: COMMERCIAL

## 2024-02-09 VITALS
DIASTOLIC BLOOD PRESSURE: 83 MMHG | WEIGHT: 137.38 LBS | BODY MASS INDEX: 21.51 KG/M2 | SYSTOLIC BLOOD PRESSURE: 137 MMHG | HEART RATE: 80 BPM

## 2024-02-09 DIAGNOSIS — F11.90 CHRONIC, CONTINUOUS USE OF OPIOIDS: ICD-10-CM

## 2024-02-09 DIAGNOSIS — R10.30 LOWER ABDOMINAL PAIN: ICD-10-CM

## 2024-02-09 DIAGNOSIS — R12 WATERBRASH: ICD-10-CM

## 2024-02-09 DIAGNOSIS — K74.3 PRIMARY BILIARY CHOLANGITIS: ICD-10-CM

## 2024-02-09 DIAGNOSIS — K76.0 NONALCOHOLIC FATTY LIVER: ICD-10-CM

## 2024-02-09 DIAGNOSIS — K74.02 ADVANCED HEPATIC FIBROSIS: ICD-10-CM

## 2024-02-09 DIAGNOSIS — K21.9 GASTROESOPHAGEAL REFLUX DISEASE, UNSPECIFIED WHETHER ESOPHAGITIS PRESENT: ICD-10-CM

## 2024-02-09 DIAGNOSIS — R12 HEARTBURN: ICD-10-CM

## 2024-02-09 DIAGNOSIS — K59.09 CHRONIC CONSTIPATION: Primary | ICD-10-CM

## 2024-02-09 DIAGNOSIS — R14.2 BELCHING: ICD-10-CM

## 2024-02-09 DIAGNOSIS — R13.10 DYSPHAGIA, UNSPECIFIED TYPE: ICD-10-CM

## 2024-02-09 PROCEDURE — 3008F BODY MASS INDEX DOCD: CPT | Mod: CPTII,S$GLB,,

## 2024-02-09 PROCEDURE — 99214 OFFICE O/P EST MOD 30 MIN: CPT | Mod: S$GLB,,,

## 2024-02-09 PROCEDURE — 1159F MED LIST DOCD IN RCRD: CPT | Mod: CPTII,S$GLB,,

## 2024-02-09 PROCEDURE — 3079F DIAST BP 80-89 MM HG: CPT | Mod: CPTII,S$GLB,,

## 2024-02-09 PROCEDURE — 99999 PR PBB SHADOW E&M-EST. PATIENT-LVL V: CPT | Mod: PBBFAC,,,

## 2024-02-09 PROCEDURE — 4010F ACE/ARB THERAPY RXD/TAKEN: CPT | Mod: CPTII,S$GLB,,

## 2024-02-09 PROCEDURE — 3075F SYST BP GE 130 - 139MM HG: CPT | Mod: CPTII,S$GLB,,

## 2024-02-09 PROCEDURE — 1160F RVW MEDS BY RX/DR IN RCRD: CPT | Mod: CPTII,S$GLB,,

## 2024-02-09 RX ORDER — PREGABALIN 50 MG/1
50 CAPSULE ORAL 3 TIMES DAILY
COMMUNITY

## 2024-02-09 RX ORDER — OMEPRAZOLE 40 MG/1
40 CAPSULE, DELAYED RELEASE ORAL DAILY
Qty: 90 CAPSULE | Refills: 1 | Status: ON HOLD | OUTPATIENT
Start: 2024-02-09 | End: 2024-05-20

## 2024-03-11 ENCOUNTER — PATIENT MESSAGE (OUTPATIENT)
Dept: GASTROENTEROLOGY | Facility: CLINIC | Age: 64
End: 2024-03-11
Payer: MEDICARE

## 2024-03-11 ENCOUNTER — TELEPHONE (OUTPATIENT)
Dept: GASTROENTEROLOGY | Facility: CLINIC | Age: 64
End: 2024-03-11
Payer: MEDICARE

## 2024-03-11 DIAGNOSIS — K59.03 DRUG-INDUCED CONSTIPATION: Primary | ICD-10-CM

## 2024-03-11 RX ORDER — NALOXEGOL OXALATE 25 MG/1
25 TABLET, FILM COATED ORAL DAILY
Qty: 30 TABLET | Refills: 2 | Status: SHIPPED | OUTPATIENT
Start: 2024-03-11 | End: 2024-06-09

## 2024-03-11 NOTE — TELEPHONE ENCOUNTER
Received message that pt is requesting an appointment with NP Peg to discuss changing her med but upon contacting pt; pt requests message is sent to Peg requesting change from Linzess to something else. As pt states Linzess is not working for her; she declines an appt as she states she was recently seen in the office on 2/9/24. Forwarding message on to provider.

## 2024-03-12 ENCOUNTER — TELEPHONE (OUTPATIENT)
Dept: GASTROENTEROLOGY | Facility: CLINIC | Age: 64
End: 2024-03-12
Payer: MEDICARE

## 2024-03-12 NOTE — TELEPHONE ENCOUNTER
Attempted twice to complete PA via covermymeds; however, received error message that patients  and name doesn't match their information. Called covermymeds and told she will fax hard copy form to complete and then fax back to their office. Form received via fax.

## 2024-03-13 ENCOUNTER — TELEPHONE (OUTPATIENT)
Dept: GASTROENTEROLOGY | Facility: CLINIC | Age: 64
End: 2024-03-13
Payer: MEDICARE

## 2024-03-13 NOTE — TELEPHONE ENCOUNTER
Called pt to let her know that I had faxed PA to [facility] this morning and fax confirmation received. So decision is pending. Pt verbalized understanding.

## 2024-03-13 NOTE — TELEPHONE ENCOUNTER
Received returned signed PA Request Form for Movantik 25 mg; faxed form to [phone]. Fax confirmation received.

## 2024-03-13 NOTE — TELEPHONE ENCOUNTER
----- Message from Barbara Hernandez sent at 3/13/2024  2:07 PM CDT -----  Type: Needs Medical Advice  Who Called:  pt  Symptoms (please be specific):  said she need someone to get in touch w/ her pharmacy--she see where her naloxegoL (MOVANTIK) 25 mg tablet was called in but they keep telling her that they do not have it--please call and advise  Pharmacy name and phone #:    [facility] - [place] - [address] [address]  [address]  [place] [address]  Phone: [phone] Fax: [phone]      Best Call Back Number: [phone]      Additional Information: thank you

## 2024-03-14 ENCOUNTER — TELEPHONE (OUTPATIENT)
Dept: GASTROENTEROLOGY | Facility: CLINIC | Age: 64
End: 2024-03-14
Payer: MEDICARE

## 2024-03-14 ENCOUNTER — PATIENT MESSAGE (OUTPATIENT)
Dept: GASTROENTEROLOGY | Facility: CLINIC | Age: 64
End: 2024-03-14
Payer: MEDICARE

## 2024-03-14 DIAGNOSIS — K58.1 IRRITABLE BOWEL SYNDROME WITH CONSTIPATION: Primary | ICD-10-CM

## 2024-03-14 RX ORDER — LUBIPROSTONE 24 UG/1
24 CAPSULE ORAL 2 TIMES DAILY WITH MEALS
Qty: 60 CAPSULE | Refills: 3 | Status: SHIPPED | OUTPATIENT
Start: 2024-03-14 | End: 2024-04-02

## 2024-03-14 NOTE — TELEPHONE ENCOUNTER
After submitting paper PA via fax, received denial letter via [facility]. Forwarded denial letter to prescriber BLANCA Khanna.

## 2024-03-14 NOTE — TELEPHONE ENCOUNTER
After receiving denial letter for Movantik from [employer], attempted to notify pt; however, had to leave [facility] requesting call back as well as sent portal message to pt:   Good morning Mrs. Etienne,   I left a voicemail message for you. We received a denial letter via fax from [employer]. NP Peg Khanna is aware. Please reach out to your pharmacy insurance for meds that are on the formulary for constipation (diagnosis code: K59.03) and let us know. I will then forward information on to nurse practitioner Peg.   Thanks  JIMI Marin

## 2024-03-19 ENCOUNTER — TELEPHONE (OUTPATIENT)
Dept: GASTROENTEROLOGY | Facility: CLINIC | Age: 64
End: 2024-03-19
Payer: MEDICARE

## 2024-03-19 NOTE — TELEPHONE ENCOUNTER
Received message via [employer] indicating error for Amitiza; reached out to pt who stated med was approved and she has med. Therefore, no further action needed.

## 2024-04-02 DIAGNOSIS — K58.1 IRRITABLE BOWEL SYNDROME WITH CONSTIPATION: Primary | ICD-10-CM

## 2024-04-08 ENCOUNTER — TELEPHONE (OUTPATIENT)
Dept: GASTROENTEROLOGY | Facility: CLINIC | Age: 64
End: 2024-04-08
Payer: MEDICARE

## 2024-04-08 NOTE — TELEPHONE ENCOUNTER
Called [facility] [phone] to request copy of denial letter as directed by BLANCA Khanna. Per Selma KENNEDY Advocate, she will fax copy of denial letter today. Our fax number verified by Selma KENNEDY.

## 2024-04-08 NOTE — TELEPHONE ENCOUNTER
Received copy of denial letter via [facility] for Movantik tab 25 mg and patient's appeals rights along with how to file an appeal (pg 2). Forwarded denial letter to prescriber BLANCA Khanna.

## 2024-04-24 ENCOUNTER — TREATMENT PLANNING (OUTPATIENT)
Dept: GASTROENTEROLOGY | Facility: CLINIC | Age: 64
End: 2024-04-24
Payer: MEDICARE

## 2024-04-24 ENCOUNTER — TELEPHONE (OUTPATIENT)
Dept: GASTROENTEROLOGY | Facility: CLINIC | Age: 64
End: 2024-04-24
Payer: MEDICARE

## 2024-04-24 NOTE — TELEPHONE ENCOUNTER
Per BLANCA Khanna, fax appeal request letter for Movantik to [facility] at [phone]. Faxed letter to [facility].

## 2024-04-24 NOTE — H&P (VIEW-ONLY)
Pt with history of chronic constipation, has been taking daily OTC stool softener with daily Linzess 145mcg orally daily and was having a 2-3bms/week, dose was increased reports linzess 145mcg stopped working and was not having a daily BM but still straining and did not feel empty, Linzess was increased to 290mcg orally daily, reported Linzess 290mcg orally daily is still having 2-3Bms/week, pt  is on chronic opioids , prescribed movantik and it was denied by pharmacy, patient tried Amitiza with no relief, so pt was switched back to Linzess 290mcg orally daily with a daily stool softener, attempting to appeal Movantik    -Peg, FNP

## 2024-05-20 ENCOUNTER — ANESTHESIA (OUTPATIENT)
Dept: ENDOSCOPY | Facility: HOSPITAL | Age: 64
End: 2024-05-20
Payer: COMMERCIAL

## 2024-05-20 ENCOUNTER — ANESTHESIA EVENT (OUTPATIENT)
Dept: ENDOSCOPY | Facility: HOSPITAL | Age: 64
End: 2024-05-20
Payer: COMMERCIAL

## 2024-05-20 ENCOUNTER — HOSPITAL ENCOUNTER (OUTPATIENT)
Facility: HOSPITAL | Age: 64
Discharge: HOME | End: 2024-05-20
Attending: INTERNAL MEDICINE | Admitting: INTERNAL MEDICINE
Payer: COMMERCIAL

## 2024-05-20 DIAGNOSIS — K21.9 GASTROESOPHAGEAL REFLUX DISEASE, UNSPECIFIED WHETHER ESOPHAGITIS PRESENT: ICD-10-CM

## 2024-05-20 DIAGNOSIS — K21.9 GERD (GASTROESOPHAGEAL REFLUX DISEASE): ICD-10-CM

## 2024-05-20 DIAGNOSIS — K29.60 EROSIVE GASTRITIS: Primary | ICD-10-CM

## 2024-05-20 DIAGNOSIS — K44.9 HIATAL HERNIA: ICD-10-CM

## 2024-05-20 PROCEDURE — 63600175 PHARM REV CODE 636 W HCPCS: Performed by: NURSE ANESTHETIST, CERTIFIED REGISTERED

## 2024-05-20 PROCEDURE — 43239 EGD BIOPSY SINGLE/MULTIPLE: CPT | Mod: ,,, | Performed by: INTERNAL MEDICINE

## 2024-05-20 PROCEDURE — 37000008 HC ANESTHESIA 1ST 15 MINUTES: Performed by: INTERNAL MEDICINE

## 2024-05-20 PROCEDURE — 25000003 PHARM REV CODE 250: Performed by: INTERNAL MEDICINE

## 2024-05-20 PROCEDURE — 88305 TISSUE EXAM BY PATHOLOGIST: CPT | Mod: TC | Performed by: PATHOLOGY

## 2024-05-20 PROCEDURE — 88312 SPECIAL STAINS GROUP 1: CPT | Mod: TC | Performed by: PATHOLOGY

## 2024-05-20 PROCEDURE — D9220A PRA ANESTHESIA: Mod: CRNA,,, | Performed by: NURSE ANESTHETIST, CERTIFIED REGISTERED

## 2024-05-20 PROCEDURE — D9220A PRA ANESTHESIA: Mod: ANES,,, | Performed by: ANESTHESIOLOGY

## 2024-05-20 PROCEDURE — 43239 EGD BIOPSY SINGLE/MULTIPLE: CPT | Performed by: INTERNAL MEDICINE

## 2024-05-20 PROCEDURE — 27201012 HC FORCEPS, HOT/COLD, DISP: Performed by: INTERNAL MEDICINE

## 2024-05-20 RX ORDER — PROPOFOL 10 MG/ML
VIAL (ML) INTRAVENOUS
Status: DISCONTINUED | OUTPATIENT
Start: 2024-05-20 | End: 2024-05-20

## 2024-05-20 RX ORDER — SODIUM CHLORIDE 9 MG/ML
INJECTION, SOLUTION INTRAVENOUS CONTINUOUS
Status: DISCONTINUED | OUTPATIENT
Start: 2024-05-20 | End: 2024-05-20 | Stop reason: HOSPADM

## 2024-05-20 RX ORDER — OMEPRAZOLE 40 MG/1
40 CAPSULE, DELAYED RELEASE ORAL
Qty: 180 CAPSULE | Refills: 3 | Status: SHIPPED | OUTPATIENT
Start: 2024-05-20 | End: 2025-05-20

## 2024-05-20 RX ADMIN — PROPOFOL 120 MG: 10 INJECTION, EMULSION INTRAVENOUS at 03:05

## 2024-05-20 RX ADMIN — SODIUM CHLORIDE: 9 INJECTION, SOLUTION INTRAVENOUS at 02:05

## 2024-05-20 NOTE — TRANSFER OF CARE
"Anesthesia Transfer of Care Note    Patient: Desirae Etienne    Procedure(s) Performed: Procedure(s) (LRB):  EGD (ESOPHAGOGASTRODUODENOSCOPY) (N/A)    Patient location: PACU    Anesthesia Type: general    Transport from OR: Transported from OR on room air with adequate spontaneous ventilation    Post pain: adequate analgesia    Post assessment: no apparent anesthetic complications    Post vital signs: stable    Level of consciousness: awake    Nausea/Vomiting: no nausea/vomiting    Complications: none    Transfer of care protocol was followed      Last vitals: Visit Vitals  /70 (BP Location: Left arm, Patient Position: Lying)   Pulse 73   Temp 36.7 °C (98.1 °F) (Skin)   Resp 16   Ht 5' 7" (1.702 m)   Wt 62.1 kg (137 lb)   SpO2 96%   Breastfeeding No   BMI 21.46 kg/m²     "

## 2024-05-20 NOTE — ANESTHESIA POSTPROCEDURE EVALUATION
Anesthesia Post Evaluation    Patient: Desirae Etienne    Procedure(s) Performed: Procedure(s) (LRB):  EGD (ESOPHAGOGASTRODUODENOSCOPY) (N/A)    Final Anesthesia Type: general      Patient location during evaluation: PACU  Patient participation: Yes- Able to Participate  Level of consciousness: awake and alert  Post-procedure vital signs: reviewed and stable  Pain management: adequate  Airway patency: patent    PONV status at discharge: No PONV  Anesthetic complications: no      Cardiovascular status: hemodynamically stable  Respiratory status: unassisted and room air  Hydration status: euvolemic  Follow-up not needed.              Vitals Value Taken Time   BP 98/63 05/20/24 1600     05/20/24 1733   Pulse 68 05/20/24 1600   Resp 16 05/20/24 1600   SpO2 95 % 05/20/24 1600         Event Time   Out of Recovery 16:11:34         Pain/Emma Score: Emma Score: 10 (5/20/2024  4:00 PM)

## 2024-05-20 NOTE — PROVATION PATIENT INSTRUCTIONS
Discharge Summary/Instructions after an Endoscopic Procedure  Patient Name: Desirae Gilmore  Patient MRN: [id number]  Patient YOB: 1960  Monday, May 20, 2024  Landon Vázquez MD  Dear patient,  As a result of recent federal legislation (The Federal Cures Act), you may   receive lab or pathology results from your procedure in your [facility]   account before your physician is able to contact you. Your physician or   their representative will relay the results to you with their   recommendations at their soonest availability.  Thank you,  RESTRICTIONS:  During your procedure today, you received medications for sedation.  These   medications may affect your judgment, balance and coordination.  Therefore,   for 24 hours, you have the following restrictions:   - DO NOT drive a car, operate machinery, make legal/financial decisions,   sign important papers or drink alcohol.    ACTIVITY:  Today: no heavy lifting, straining or running due to procedural   sedation/anesthesia.  The following day: return to full activity including work.  DIET:  Eat and drink normally unless instructed otherwise.     TREATMENT FOR COMMON SIDE EFFECTS:  - Mild abdominal pain, nausea, belching, bloating or excessive gas:  rest,   eat lightly and use a heating pad.  - Sore Throat: treat with throat lozenges and/or gargle with warm salt   water.  - Because air was used during the procedure, expelling large amounts of air   from your rectum or belching is normal.  - If a bowel prep was taken, you may not have a bowel movement for 1-3 days.    This is normal.  SYMPTOMS TO WATCH FOR AND REPORT TO YOUR PHYSICIAN:  1. Abdominal pain or bloating, other than gas cramps.  2. Chest pain.  3. Back pain.  4. Signs of infection such as: chills or fever occurring within 24 hours   after the procedure.  5. Rectal bleeding, which would show as bright red, maroon, or black stools.   (A tablespoon of blood from the rectum is not serious, especially if    [de-identified] : \par Mr. Vasquez is a 74 y/o M, current smoker, PMH of DM, HTN, HLD and AS, + smoker presenting for initial evaluation today for newly diagnosed NSCLC, adenocarcinoma. He presents with his son Primo.\par \par His history dates back to late April 2021 when he presented to the ED for shortness of breath after having a positive stress test as an outpatient. He reported that he could walk less than 1 block before becoming SOB which has progressively worsened. He denies any palpitations or chest pain/dizziness/syncope. 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 He was seen by CT surgery while inpatient for AVR/CABG. Preop CT Chest showed a lobulated solid 2.7 cm RUL nodule, and a 5 mm ANIA nodule, and a 4.0 x 2.9 cm right paratracheal soft tissue lesion. S/P Bronch/EBUS 4/30/2021- Prelim Bronch/EBUS path showed carcinoma. Brain MRI negative for metastatic disease. He was discharged with outpatient follow up with oncology and cardiology. \par \par He was subsequently discharged and underwent a PET/CT as outpatient which showed FDG avid right upper lobe nodule, SUV max 23.7 compatible with biologic tumor activity. Ipsilateral FDG avid lymphadenopathy up to 17.0 compatible with hetal metastases.\par \par Final pathology shows Non-small cell carcinoma, favor adenocarcinoma. Immunohistochemical stains are performed and show the tumor is\par positive for CK7, negative for CK20, TTF-1, napsin, CDX2, PAX8; findings are compatible with primary lung adenocarcinoma\par \par  hemorrhoids are present.)  6. Vomiting.  7. Weakness or dizziness.  GO DIRECTLY TO THE NEAREST EMERGENCY ROOM IF YOU HAVE ANY OF THE FOLLOWING:      Difficulty breathing              Chills and/or fever over 101 F   Persistent vomiting and/or vomiting blood   Severe abdominal pain   Severe chest pain   Black, tarry stools   Bleeding- more than one tablespoon   Any other symptom or condition that you feel may need urgent attention  Your doctor recommends these additional instructions:  If any biopsies were taken, your doctors clinic will contact you in 1 to 2   weeks with any results.  - Patient has a contact number available for emergencies.  The signs and   symptoms of potential delayed complications were discussed with the   patient.  Return to normal activities tomorrow.  Written discharge   instructions were provided to the patient.   - Resume previous diet.   - Continue present medications.   - No aspirin, ibuprofen, naproxen, or other non-steroidal anti-inflammatory   drugs.   - Await pathology results.   - Discharge patient to home (ambulatory).   - Follow an antireflux regimen.   - Use Prilosec (omeprazole) 40 mg PO BID.   - Return to GI clinic after studies are complete.  For questions, problems or results please call your physician - Landon Vázquez MD at Work:  [phone].  [facility], EMERGENCY ROOM PHONE NUMBER: [phone]  IF A COMPLICATION OR EMERGENCY SITUATION ARISES AND YOU ARE UNABLE TO REACH   YOUR PHYSICIAN - GO DIRECTLY TO THE EMERGENCY ROOM.  Landon Vázquez MD  5/20/2024 3:34:33 PM  This report has been verified and signed electronically.  Dear patient,  As a result of recent federal legislation (The Federal Cures Act), you may   receive lab or pathology results from your procedure in your [facility]   account before your physician is able to contact you. Your physician or   their representative will relay the results to you with their   recommendations at their soonest  [de-identified] : terval History: 5/19/2021: Today, he feels well. He denies any chest pain, coughing, wheezing, or abdominal pain. He is still actively smoking. He finds it very difficult to stop. He smokes about 1 PPD for at least 60 years. \par 6/24/21:\par  Mr. CASTANEDA came for a follow up visit for NSCLC, lung adenocarcinoma. He completed 15 RT treatment and he is due for the 3rd cycle of Chemo. He reports feels well. He denies any chest pain, coughing, wheezing, abdominal pain. dysphagia. Patient tolerating diet well. \par CBC , CMP reviewed with acceptable results. \par  He is still actively smoking. He finds it very difficult to stop. He smokes about 1 PPD for at least 60 years.\par 7/15/21;\par  Mr. CASTANEDA came for a follow up visit for NSCLC, lung adenocarcinoma. He completed 22 RT treatment and he is due for the 6th cycle of Carboplatin and taxol. He reports feels well. But he reports SOB on exertion and occasionally coughing. He is still actively smoking about 15 cigarette per day. \par  He denies any chest pain, abdominal pain. dysphagia. Patient tolerating diet well. \par CBC , CMP reviewed with acceptable results. Pulse Ox is 98% on RA.\par  He finds it very difficult to stop. He smokes about 1 PPD for at least 60 years.\par We explain to monitor for Sever coughing or CP. he needs to report to ER in case if any worsen symptoms. \par \par 08/12/2021 patient returns after completion of chemoradiation for stage 3 NSCLC , he complains of weakness, cough , decreased appetite and abnormal taste . he has dyspnea on mild exertion . he is receiving IV magnesium for low mg . He lost few lbs so far. Hgb is down to 9.7 from 12 in one month , no hematochezia. \par \par 08/26/2021 Patient returns for follow up with more weight loss . His breathing is stable , no cough . no fever . \par \par 1/6/2022 Patient returns for follow p , he feels well , he is gaining weight , he denies change in cough or breathing . As per his son , he was never given prescription for PET scan . \par \par 2/10/2022 Patient returns for second dose of durvalmab , he complains of LLQ pain , constipation , also lower back pain radiating to the LLE with activity . no hematochezia , weakness. Still awaiting Ct scan ( PET was denied )  availability.  Thank you,  PROVATION

## 2024-05-20 NOTE — ANESTHESIA PREPROCEDURE EVALUATION
05/20/2024  Desirae Etienne is a 63 y.o., female.      Pre-op Assessment    I have reviewed the Patient Summary Reports.     I have reviewed the Nursing Notes. I have reviewed the NPO Status.   I have reviewed the Medications.     Review of Systems  Anesthesia Hx:  No problems with previous Anesthesia                Social:  Smoker       Cardiovascular:     Hypertension                                  Hypertension         Hepatic/GI:     GERD Liver Disease, Hepatitis    Gerd       Liver Disease, Hepatitis        Musculoskeletal:  Arthritis        Arthritis          Neurological:    Neuromuscular Disease,  Headaches      Dx of Headaches   Arthritis                         Neuromuscular Disease   Psych:  Psychiatric History  depression                Physical Exam  General: Well nourished, Cooperative, Oriented and Alert    Airway:  Mallampati: II   Mouth Opening: Normal  TM Distance: Normal    Dental:  Edentulous    Chest/Lungs:  Normal Respiratory Rate    Heart:  Rate: Normal        Anesthesia Plan  Type of Anesthesia, risks & benefits discussed:    Anesthesia Type: Gen Natural Airway  Intra-op Monitoring Plan: Standard ASA Monitors  Induction:  IV  Informed Consent: Informed consent signed with the Patient and all parties understand the risks and agree with anesthesia plan.  All questions answered.   ASA Score: 3    Ready For Surgery From Anesthesia Perspective.     .       [facility]   [address] [place] [address]  [phone]      Park Scott : 2001 MRN: [id number]      2018  Time Session Began: 11:30am Time Session Ended: 12:15pm    Session Type: 45 Minute Therapy (84175)    Others Present: None    Intervention: Behavioral, Cognitive, Supportive    Patient Level of Functioning: No Change    Suicide/Homicide/Violence Ideation: No    If Yes, explain:     Current Outpatient Medications   Medication Sig   • lisdexamfetamine (VYVANSE) 30 MG capsule Take 1 capsule by mouth every morning.   • citalopram (CELEXA) 20 MG tablet Take 1-2 tablets by mouth daily.   • ondansetron (ZOFRAN ODT) 8 MG disintegrating tablet Place 1 tablet onto the tongue every 8 hours as needed for Nausea.   • rizatriptan (MAXALT) 10 MG tablet Take 1 tablet by mouth at onset of migraine. May repeat after 2 hours if needed.   • acetaminophen (TYLENOL) 325 MG tablet Take 1 tablet by mouth every 6 hours as needed for Pain.   • scopolamine (TRANSDERM_SCOP) 1 MG/3DAYS patch Place 1 patch onto the skin every 72 hours.   • Pseudoeph-Doxylamine-DM-APAP (NYQUIL D COLD/FLU PO)    • ibuprofen (MOTRIN) 200 MG tablet Take 200 mg by mouth every 6 hours as needed.     No current facility-administered medications for this visit.        Change in Medication(s) Reported: No  If Yes, explain:     Patient/Family Education Provided: Yes  Patient/Family Displays Understanding: Yes    If No, explain: N/A    Chief complaint in patient's own words: struggling with mood    Progress Note containing chief complaint and symptoms/problems related to the complaint:    DARP:   D:  Patient presented for individual therapy. Pt stated he is frustrated with his attention and back problems. PT stated he has tried so many things to help himself improve but not much has changed. PT stated he's happy that he is connected with Valery Anaya to give medication  another try.  PT stated he has not had much luck with going to bed earlier or exercising.  A:  Writer provided supportive listening. Writer encouraged client to practice relaxation on a daily basis, including breathing exercise and meditation.   R:  Client presented with a congruent affect. Client was receptive to writer's feedback.  P:  Client will return for psychotherapy in two weeks?      Need for Community Resources Assessed: Yes    Resources Needed: No    If Yes, what resources: N/A    Primary Diagnosis: F90.2 ADHD (attention deficit hyperactivity disorder), combined type  (primary encounter diagnosis)  F41.9, F32.9 Anxiety and depression      Treatment Plan: Unchanged    Discharge Plan: Strategies Discussed to Maintain Gains    Next Appointment: two weeks  Angélica Leggett LPC, SAC

## 2024-05-21 VITALS
HEART RATE: 68 BPM | RESPIRATION RATE: 16 BRPM | DIASTOLIC BLOOD PRESSURE: 63 MMHG | OXYGEN SATURATION: 95 % | HEIGHT: 67 IN | BODY MASS INDEX: 21.5 KG/M2 | SYSTOLIC BLOOD PRESSURE: 98 MMHG | TEMPERATURE: 98 F | WEIGHT: 137 LBS

## 2024-06-03 ENCOUNTER — ANESTHESIA (OUTPATIENT)
Dept: ENDOSCOPY | Facility: HOSPITAL | Age: 64
End: 2024-06-03
Payer: COMMERCIAL

## 2024-06-03 ENCOUNTER — ANESTHESIA EVENT (OUTPATIENT)
Dept: ENDOSCOPY | Facility: HOSPITAL | Age: 64
End: 2024-06-03
Payer: COMMERCIAL

## 2024-06-03 ENCOUNTER — HOSPITAL ENCOUNTER (OUTPATIENT)
Facility: HOSPITAL | Age: 64
Discharge: HOME | End: 2024-06-03
Attending: INTERNAL MEDICINE | Admitting: INTERNAL MEDICINE
Payer: COMMERCIAL

## 2024-06-03 VITALS
HEART RATE: 74 BPM | TEMPERATURE: 98 F | RESPIRATION RATE: 16 BRPM | SYSTOLIC BLOOD PRESSURE: 104 MMHG | DIASTOLIC BLOOD PRESSURE: 59 MMHG | OXYGEN SATURATION: 100 %

## 2024-06-03 DIAGNOSIS — K59.09 CHRONIC CONSTIPATION: ICD-10-CM

## 2024-06-03 DIAGNOSIS — K63.5 POLYP OF COLON, UNSPECIFIED PART OF COLON, UNSPECIFIED TYPE: Primary | ICD-10-CM

## 2024-06-03 DIAGNOSIS — K63.89 MELANOSIS COLI: ICD-10-CM

## 2024-06-03 PROCEDURE — 27201089 HC SNARE, DISP (ANY): Performed by: INTERNAL MEDICINE

## 2024-06-03 PROCEDURE — 88305 TISSUE EXAM BY PATHOLOGIST: CPT | Mod: TC,59 | Performed by: PATHOLOGY

## 2024-06-03 PROCEDURE — 27201028 HC NEEDLE, SCLERO: Performed by: INTERNAL MEDICINE

## 2024-06-03 PROCEDURE — 45381 COLONOSCOPY SUBMUCOUS NJX: CPT | Mod: 33,51,, | Performed by: INTERNAL MEDICINE

## 2024-06-03 PROCEDURE — 45381 COLONOSCOPY SUBMUCOUS NJX: CPT | Mod: PT | Performed by: INTERNAL MEDICINE

## 2024-06-03 PROCEDURE — 63600175 PHARM REV CODE 636 W HCPCS: Performed by: NURSE ANESTHETIST, CERTIFIED REGISTERED

## 2024-06-03 PROCEDURE — 37000009 HC ANESTHESIA EA ADD 15 MINS: Performed by: INTERNAL MEDICINE

## 2024-06-03 PROCEDURE — 27200997: Performed by: INTERNAL MEDICINE

## 2024-06-03 PROCEDURE — 25000003 PHARM REV CODE 250: Performed by: INTERNAL MEDICINE

## 2024-06-03 PROCEDURE — 45380 COLONOSCOPY AND BIOPSY: CPT | Mod: 33,59,, | Performed by: INTERNAL MEDICINE

## 2024-06-03 PROCEDURE — 45385 COLONOSCOPY W/LESION REMOVAL: CPT | Mod: 33,22,, | Performed by: INTERNAL MEDICINE

## 2024-06-03 PROCEDURE — 25000003 PHARM REV CODE 250: Performed by: NURSE ANESTHETIST, CERTIFIED REGISTERED

## 2024-06-03 PROCEDURE — 37000008 HC ANESTHESIA 1ST 15 MINUTES: Performed by: INTERNAL MEDICINE

## 2024-06-03 PROCEDURE — 45380 COLONOSCOPY AND BIOPSY: CPT | Mod: PT,59 | Performed by: INTERNAL MEDICINE

## 2024-06-03 PROCEDURE — 45385 COLONOSCOPY W/LESION REMOVAL: CPT | Mod: PT | Performed by: INTERNAL MEDICINE

## 2024-06-03 RX ORDER — PROPOFOL 10 MG/ML
VIAL (ML) INTRAVENOUS
Status: DISCONTINUED | OUTPATIENT
Start: 2024-06-03 | End: 2024-06-03

## 2024-06-03 RX ORDER — SODIUM CHLORIDE 9 MG/ML
INJECTION, SOLUTION INTRAVENOUS CONTINUOUS
Status: DISCONTINUED | OUTPATIENT
Start: 2024-06-03 | End: 2024-06-03 | Stop reason: HOSPADM

## 2024-06-03 RX ORDER — PHENYLEPHRINE HYDROCHLORIDE 10 MG/ML
INJECTION INTRAVENOUS
Status: DISCONTINUED | OUTPATIENT
Start: 2024-06-03 | End: 2024-06-03

## 2024-06-03 RX ORDER — LIDOCAINE HYDROCHLORIDE 20 MG/ML
INJECTION INTRAVENOUS
Status: DISCONTINUED | OUTPATIENT
Start: 2024-06-03 | End: 2024-06-03

## 2024-06-03 RX ADMIN — PHENYLEPHRINE HYDROCHLORIDE 100 MCG: 10 INJECTION INTRAVENOUS at 01:06

## 2024-06-03 RX ADMIN — PROPOFOL 50 MG: 10 INJECTION, EMULSION INTRAVENOUS at 01:06

## 2024-06-03 RX ADMIN — PHENYLEPHRINE HYDROCHLORIDE 200 MCG: 10 INJECTION INTRAVENOUS at 01:06

## 2024-06-03 RX ADMIN — LIDOCAINE HYDROCHLORIDE 100 MG: 20 INJECTION, SOLUTION INTRAVENOUS at 01:06

## 2024-06-03 RX ADMIN — PROPOFOL 40 MG: 10 INJECTION, EMULSION INTRAVENOUS at 01:06

## 2024-06-03 RX ADMIN — SODIUM CHLORIDE: 9 INJECTION, SOLUTION INTRAVENOUS at 01:06

## 2024-06-03 RX ADMIN — GLYCOPYRROLATE 0.2 MG: 0.2 INJECTION, SOLUTION INTRAMUSCULAR; INTRAVITREAL at 01:06

## 2024-06-03 RX ADMIN — PROPOFOL 100 MG: 10 INJECTION, EMULSION INTRAVENOUS at 01:06

## 2024-06-03 RX ADMIN — SODIUM CHLORIDE: 9 INJECTION, SOLUTION INTRAVENOUS at 12:06

## 2024-06-03 NOTE — PLAN OF CARE
Vss, abby po fluids, denies pain, ambulates easily. IV removed, catheter intact. Discharge instructions provided and states understanding. States ready to go home.  Discharged from facility with family per wheelchair.

## 2024-06-03 NOTE — PROVATION PATIENT INSTRUCTIONS
Discharge Summary/Instructions after an Endoscopic Procedure  Patient Name: Desirae Gilmore  Patient MRN: [id number]  Patient YOB: 1960  Monday, Chuyita 3, 2024  Landon Vázquez MD  Dear patient,  As a result of recent federal legislation (The Federal Cures Act), you may   receive lab or pathology results from your procedure in your [facility]   account before your physician is able to contact you. Your physician or   their representative will relay the results to you with their   recommendations at their soonest availability.  Thank you,  RESTRICTIONS:  During your procedure today, you received medications for sedation.  These   medications may affect your judgment, balance and coordination.  Therefore,   for 24 hours, you have the following restrictions:   - DO NOT drive a car, operate machinery, make legal/financial decisions,   sign important papers or drink alcohol.    ACTIVITY:  Today: no heavy lifting, straining or running due to procedural   sedation/anesthesia.  The following day: return to full activity including work.  DIET:  Eat and drink normally unless instructed otherwise.     TREATMENT FOR COMMON SIDE EFFECTS:  - Mild abdominal pain, nausea, belching, bloating or excessive gas:  rest,   eat lightly and use a heating pad.  - Sore Throat: treat with throat lozenges and/or gargle with warm salt   water.  - Because air was used during the procedure, expelling large amounts of air   from your rectum or belching is normal.  - If a bowel prep was taken, you may not have a bowel movement for 1-3 days.    This is normal.  SYMPTOMS TO WATCH FOR AND REPORT TO YOUR PHYSICIAN:  1. Abdominal pain or bloating, other than gas cramps.  2. Chest pain.  3. Back pain.  4. Signs of infection such as: chills or fever occurring within 24 hours   after the procedure.  5. Rectal bleeding, which would show as bright red, maroon, or black stools.   (A tablespoon of blood from the rectum is not serious, especially if    hemorrhoids are present.)  6. Vomiting.  7. Weakness or dizziness.  GO DIRECTLY TO THE NEAREST EMERGENCY ROOM IF YOU HAVE ANY OF THE FOLLOWING:      Difficulty breathing              Chills and/or fever over 101 F   Persistent vomiting and/or vomiting blood   Severe abdominal pain   Severe chest pain   Black, tarry stools   Bleeding- more than one tablespoon   Any other symptom or condition that you feel may need urgent attention  Your doctor recommends these additional instructions:  If any biopsies were taken, your doctors clinic will contact you in 1 to 2   weeks with any results.  - Patient has a contact number available for emergencies.  The signs and   symptoms of potential delayed complications were discussed with the   patient.  Return to normal activities tomorrow.  Written discharge   instructions were provided to the patient.   - High fiber diet.   - Continue present medications.   - No aspirin, ibuprofen, naproxen, or other non-steroidal anti-inflammatory   drugs for 2 weeks after polyp removal.   - Await pathology results.   - Repeat colonoscopy in 3 years for surveillance.   - Discharge patient to home (ambulatory).   - Return to GI office after studies are complete.  For questions, problems or results please call your physician - Landon Vázquez MD at Work:  [phone].  [facility], EMERGENCY ROOM PHONE NUMBER: [phone]  IF A COMPLICATION OR EMERGENCY SITUATION ARISES AND YOU ARE UNABLE TO REACH   YOUR PHYSICIAN - GO DIRECTLY TO THE EMERGENCY ROOM.  Landon Vázquez MD  6/3/2024 2:02:09 PM  This report has been verified and signed electronically.  Dear patient,  As a result of recent federal legislation (The Federal Cures Act), you may   receive lab or pathology results from your procedure in your [facility]   account before your physician is able to contact you. Your physician or   their representative will relay the results to you with their   recommendations at their soonest  availability.  Thank you,  PROVATION

## 2024-06-03 NOTE — H&P
CC: Change in bowel habits    63 year old female with above. States that symptoms are stable, no alleviating/exacerbating factors. No family history of colorectal CA. No personal history of polyps. No bleeding or weight loss.     ROS:  No headache, no fever/chills, no chest pain/SOB, no nausea/vomiting/diarrhea/constipation/GI bleeding/abdominal pain, no dysuria/hematuria.    VSSAF   Exam:   Alert and oriented x 3; no apparent distress   PERRLA, sclera anicteric  CV: Regular rate/rhythm, normal PMI   Lungs: Clear bilaterally with no wheeze/rales   Abdomen: Soft, NT/ND, normal bowel sounds   Ext: No cyanosis, clubbing     Impression:   As above    Plan:   Proceed with endoscopy. Further recs to follow.

## 2024-06-03 NOTE — TRANSFER OF CARE
Anesthesia Transfer of Care Note    Patient: Desirae Etienne    Procedure(s) Performed: Procedure(s) (LRB):  COLONOSCOPY (N/A)    Patient location: PACU    Anesthesia Type: general    Transport from OR: Transported from OR on room air with adequate spontaneous ventilation    Post pain: adequate analgesia    Post assessment: no apparent anesthetic complications    Post vital signs: stable    Level of consciousness: awake    Nausea/Vomiting: no nausea/vomiting    Complications: none    Transfer of care protocol was followed      Last vitals: Visit Vitals  /66 (BP Location: Left arm, Patient Position: Lying)   Pulse 74   Temp 36.5 °C (97.7 °F) (Skin)   Resp 16   SpO2 98%   Breastfeeding No

## 2024-06-03 NOTE — ANESTHESIA PREPROCEDURE EVALUATION
06/03/2024  Desirae Etienne is a 63 y.o., female.      Pre-op Assessment          Review of Systems  Cardiovascular:     Hypertension                                  Hypertension         Hepatic/GI:     GERD Liver Disease, Hepatitis    Gerd       Liver Disease, Hepatitis        Musculoskeletal:  Arthritis        Arthritis          Neurological:    Neuromuscular Disease,  Headaches      Dx of Headaches   Arthritis                         Neuromuscular Disease   Psych:  Psychiatric History                  Physical Exam  General: Well nourished        Anesthesia Plan  Type of Anesthesia, risks & benefits discussed:    Anesthesia Type: Gen Natural Airway  Intra-op Monitoring Plan: Standard ASA Monitors  Induction:  IV  Informed Consent: Informed consent signed with the Patient and all parties understand the risks and agree with anesthesia plan.  All questions answered.   ASA Score: 2  Day of Surgery Review of History & Physical: H&P Update referred to the surgeon/provider.    Ready For Surgery From Anesthesia Perspective.     .

## 2024-06-04 NOTE — ANESTHESIA POSTPROCEDURE EVALUATION
Anesthesia Post Evaluation    Patient: Desirae Etienne    Procedure(s) Performed: Procedure(s) (LRB):  COLONOSCOPY (N/A)    Final Anesthesia Type: general      Patient location during evaluation: PACU  Patient participation: Yes- Able to Participate  Level of consciousness: awake and alert  Post-procedure vital signs: reviewed and stable  Pain management: adequate  Airway patency: patent    PONV status at discharge: No PONV  Anesthetic complications: no      Cardiovascular status: blood pressure returned to baseline  Respiratory status: unassisted and room air  Hydration status: euvolemic  Follow-up not needed.              Vitals Value Taken Time   /59 06/03/24 1420   Temp 36.4 °C (97.5 °F) 06/03/24 1407   Pulse 74 06/03/24 1421   Resp 16 06/03/24 1417   SpO2 99 % 06/03/24 1421   Vitals shown include unfiled device data.      Event Time   Out of Recovery 14:29:14         Pain/Emma Score: Emma Score: 10 (6/3/2024  2:22 PM)

## 2024-07-01 ENCOUNTER — OFFICE VISIT (OUTPATIENT)
Dept: HEPATOLOGY | Facility: CLINIC | Age: 64
End: 2024-07-01
Payer: COMMERCIAL

## 2024-07-01 ENCOUNTER — TELEPHONE (OUTPATIENT)
Dept: GASTROENTEROLOGY | Facility: CLINIC | Age: 64
End: 2024-07-01
Payer: MEDICARE

## 2024-07-01 DIAGNOSIS — D18.03 HEPATIC HEMANGIOMA: ICD-10-CM

## 2024-07-01 DIAGNOSIS — Z85.05 ENCOUNTER FOR FOLLOW-UP SURVEILLANCE OF LIVER CANCER: ICD-10-CM

## 2024-07-01 DIAGNOSIS — K74.3 PRIMARY BILIARY CHOLANGITIS: Primary | ICD-10-CM

## 2024-07-01 DIAGNOSIS — L29.9 PRURITUS: ICD-10-CM

## 2024-07-01 DIAGNOSIS — K76.0 METABOLIC DYSFUNCTION-ASSOCIATED STEATOTIC LIVER DISEASE (MASLD): ICD-10-CM

## 2024-07-01 DIAGNOSIS — K74.02 ADVANCED HEPATIC FIBROSIS: ICD-10-CM

## 2024-07-01 DIAGNOSIS — E78.5 HYPERLIPIDEMIA, UNSPECIFIED HYPERLIPIDEMIA TYPE: ICD-10-CM

## 2024-07-01 DIAGNOSIS — I10 HYPERTENSION, UNSPECIFIED TYPE: ICD-10-CM

## 2024-07-01 DIAGNOSIS — Z08 ENCOUNTER FOR FOLLOW-UP SURVEILLANCE OF LIVER CANCER: ICD-10-CM

## 2024-07-01 PROCEDURE — 4010F ACE/ARB THERAPY RXD/TAKEN: CPT | Mod: CPTII,95,, | Performed by: NURSE PRACTITIONER

## 2024-07-01 PROCEDURE — 99214 OFFICE O/P EST MOD 30 MIN: CPT | Mod: 95,,, | Performed by: NURSE PRACTITIONER

## 2024-07-01 PROCEDURE — 1159F MED LIST DOCD IN RCRD: CPT | Mod: CPTII,95,, | Performed by: NURSE PRACTITIONER

## 2024-07-01 PROCEDURE — 1160F RVW MEDS BY RX/DR IN RCRD: CPT | Mod: CPTII,95,, | Performed by: NURSE PRACTITIONER

## 2024-07-01 RX ORDER — DIAZEPAM 5 MG/1
5 TABLET ORAL DAILY PRN
COMMUNITY
Start: 2024-04-02

## 2024-07-01 RX ORDER — QUETIAPINE FUMARATE 100 MG/1
100 TABLET, FILM COATED ORAL NIGHTLY
COMMUNITY

## 2024-07-01 RX ORDER — HYDROXYZINE PAMOATE 25 MG/1
25 CAPSULE ORAL EVERY 8 HOURS PRN
Qty: 90 CAPSULE | Refills: 3 | Status: SHIPPED | OUTPATIENT
Start: 2024-07-01

## 2024-07-01 RX ORDER — URSODIOL 500 MG/1
500 TABLET, FILM COATED ORAL 2 TIMES DAILY
Qty: 180 TABLET | Refills: 3 | Status: SHIPPED | OUTPATIENT
Start: 2024-07-01 | End: 2025-07-01

## 2024-07-01 NOTE — TELEPHONE ENCOUNTER
----- Message from She Pascal sent at 7/1/2024 11:14 AM CDT -----  Contact: pt [phone]  Type: Needs Medical Advice  Who Called:  Pt     Pharmacy name and phone #:    [facility] - [place], [place] - [address]  [address]  [place]  [place] [address]  Phone: [phone] Fax: [phone]      Best Call Back Number: [phone]    Additional Information:     Pt stated pharmacy does not have rx for omeprazole (PRILOSEC) 40 MG capsule  On file. Pt asking this can be resent pls. Thank you

## 2024-07-01 NOTE — Clinical Note
Please contact patient to arrange a f/u visit with me in person in [place] in 6 months with [facility] prior to the visit. I have mailed external orders to the patient for labs, US and DEXA scan to be done prior to her next visit with me. Please remind the patient to do the testing prior to her next visit. Thanks!

## 2024-07-01 NOTE — PROGRESS NOTES
The patient location is: [place], [place].  The chief complaint leading to consultation is: PBC    Visit type: audiovisual    Face to Face time with patient: 16  30 minutes of total time spent on the encounter, which includes face to face time and non-face to face time preparing to see the patient (eg, review of tests), Obtaining and/or reviewing separately obtained history, Documenting clinical information in the electronic or other health record, Independently interpreting results (not separately reported) and communicating results to the patient/family/caregiver, or Care coordination (not separately reported).     Each patient to whom he or she provides medical services by telemedicine is:  (1) informed of the relationship between the physician and patient and the respective role of any other health care provider with respect to management of the patient; and (2) notified that he or she may decline to receive medical services by telemedicine and may withdraw from such care at any time.    Notes:     [facility] VISIT ESTABLISHED PT NOTE    REFERRING PROVIDER:  No ref. provider found    CHIEF COMPLAINT: PBC    HPI: This is a 63 y.o. White female with PMH noted below, presenting for follow up. She was last seen in clinic by myself for an in person visit in 1/2024.    She has a history of fatty liver with stage 3 fibrosis noted on liver biopsy in 10/2020 (no hepatic steatosis noted on path report). The liver biopsy was done during laproscopic cholecystectomy on 10/27/2020, and showed chronic portal inflammation with interface activity, with bridging fibrosis. Serological work up was negative for autoimmune and viral etiologies. Labs continued to show normal liver function, with a persistently elevated alkaline phosphatase (> 400); her transaminases are normal. She also had an incidental finding of hemangiomas on cross sectional imaging.    She underwent MRI of the Abdomen with MRCP in June 2022 for further  "evaluation of chronically elevated Alk Phos. Imaging showed "mild central intrahepatic biliary ductal prominence and mild dilatation of the common hepatic duct, with gradual tapering to normal caliber distal common bile duct", with no evidence of a filling defect or biliary stricture above the level of the ampulla, and no evidence of main pancreatic ductal dilatation. The radiologist recommended further evaluation with ERCP if ampullary stricture is suspected. The MRI also showed 2 small hepatic hemangiomas (1.5 and 1.2 cm, respectively) in segment VII. EUS was done in late July 2022 and showed no gallstones, bile duct strictures or significant pathology in the CBD or pancreatic duct.     Repeat labs in 8/2022 showed an ALP of 760, with normal transaminases and intact synthetic function. ACE level normal. CA 19-9 and AFP were also normal. AMA was positive (127.2), consistent with diagnosis of PBC. Labs also showed that she has not been exposed to, and is not immune to Hepatitis A or B. She has started vaccination series for Hepatitis A and B, and has received 2 vaccinations thus far. She was started on Ursodiol 500 mg PO BID in 8/2022, and has tolerated treatment well.     Follow up labs in September, and then again in December 2022 showed significant improvement in ALP (250's); blood counts, kidney and liver function tests remain normal/stable. Most recent LFT's in 6/2024 showed an ALP of 152; the rest of her LFT's are normal.     Per patient report, she has lost 30 lbs over the past 2 years, through dietary changes. BMI is now 21. B/P and lipids are well controlled on current regimen. Last Abdominal US in 6/2024 (done at [facility]) for HCC surveillance showed no concerning liver lesions, as below:    FINDINGS:     The visualized aorta is unremarkable.   The IVC is unremarkable.     The liver measures up to 15.0 cm.  2 adjacent right hepatic lobe   hemangiomas are unchanged     The portal vein, IVC, " and splenic vein gated duplex doppler waveform   and color flow analysis suggests antegrade flow.   There is no free peritoneal fluid within the abdomen detected.     The pancreas visualized has a normal appearance.     The gallbladder is surgically absent.     The right kidney images demonstrate adequate corticomedullary   differentiation and normal Color Doppler evaluation. The right kidney   measures 10.0 cm in length.     The left kidney images demonstrate adequate corticomedullary   differentiation and normal Color Doppler evaluation. The left kidney   measures 9.0 cm in length.     The spleen measures 9.0 cm.     AFP remains normal. She is now followed by [facility] for multiple GI issues. She underwent EGD and Colonoscopy in 5/2024. EGD showed no signs of portal hypertension, as below:    Findings:       The examined esophagus was normal.        The Z-line was regular and was found 37 cm from the incisors.        A medium-sized hiatal hernia was present.        Diffuse moderate inflammation characterized by congestion (edema),        erosions and erythema was found in the gastric body and in the        gastric antrum. Biopsies were taken with a cold forceps for        histology.        The examined duodenum was normal.   Impression:     - Normal esophagus.                          - Z-line regular, 37 cm from the incisors.                          - Medium-sized hiatal hernia.                          - Gastritis. Biopsied.                          - Normal examined duodenum.     She is well appearing, and has no signs or symptoms of hepatic decompensation including jaundice, dark urine, abdominal distention, lower extremity edema, hematemesis, melena, or periods of confusion suggestive of hepatic encephalopathy. Further ROS as below.     Review of patient's allergies indicates:  No Known Allergies  Current Outpatient Medications on File Prior to Visit   Medication Sig Dispense Refill    diazePAM (VALIUM) 5  MG tablet Take 5 mg by mouth daily as needed.      atorvastatin (LIPITOR) 20 MG tablet Take 20 mg by mouth once daily.      carBAMazepine (CARBATROL) 100 MG CM12 Take 100 mg by mouth 2 (two) times a day.      furosemide (LASIX) 20 MG tablet Take 20 mg by mouth daily as needed.      galcanezumab-gnlm (EMGALITY PEN) 120 mg/mL PnIj 120 mg.      [] linaCLOtide (LINZESS) 290 mcg Cap capsule Take 1 capsule (290 mcg total) by mouth before breakfast. 30 capsule 2    lisinopriL (PRINIVIL,ZESTRIL) 5 MG tablet Take 5 mg by mouth.      omeprazole (PRILOSEC) 40 MG capsule Take 1 capsule (40 mg total) by mouth 2 (two) times daily before meals. 180 capsule 3    oxyCODONE (ROXICODONE) 10 mg Tab immediate release tablet Take 10 mg by mouth 2 (two) times daily as needed.      pregabalin (LYRICA) 150 MG capsule Take 150 mg by mouth 2 (two) times daily.      QUEtiapine (SEROQUEL) 100 MG Tab Take 100 mg by mouth every evening.      QUEtiapine (SEROQUEL) 50 MG tablet Take 50 mg by mouth every evening.      tiZANidine (ZANAFLEX) 4 MG tablet Take 4 mg by mouth every evening.      zonisamide (ZONEGRAN) 25 MG Cap Take 25 mg by mouth once daily.       No current facility-administered medications on file prior to visit.     PMHX:  has a past medical history of Advanced hepatic fibrosis, Arthritis, Chronic back pain, Fatty liver, Fibromyalgia, GERD (gastroesophageal reflux disease), HTN (hypertension), and Primary biliary cholangitis.    PSHX:  has a past surgical history that includes Laparoscopic cholecystectomy with cholangiography (10/27/2020); Liver biopsy (10/27/2020); Total knee arthroplasty; Endoscopic ultrasound of upper gastrointestinal tract (N/A, 2022); ERCP (N/A, 2022); Cholecystectomy; Appendectomy; Esophagogastroduodenoscopy (N/A, 2024); and Colonoscopy (N/A, 6/3/2024).    FAMILY HISTORY: Negative for liver disease, reviewed in EPIC    SOCIAL HISTORY:   Social History     Tobacco Use   Smoking Status Every  Day    Current packs/day: 1.00    Types: Cigarettes   Smokeless Tobacco Not on file     Social History     Substance and Sexual Activity   Alcohol Use Never     Social History     Substance and Sexual Activity   Drug Use Never     ROS:   GENERAL: Denies fever, chills, weight loss/gain, + fatigue + dry mouth  HEENT: Denies headaches, dizziness, vision/hearing changes  CARDIOVASCULAR: Denies chest pain, palpitations, or edema  RESPIRATORY: Denies dyspnea, cough  GI: + Chronic GI issues, including GERD, diarrhea, constipation - followed by GI.   : Denies dysuria, hematuria   SKIN: Denies rash, + pruritus - improved per patient report - RX for Hydroxyzine refilled.  NEURO: Denies confusion, memory loss, or mood changes  PSYCH: Denies depression or anxiety  HEME/LYMPH: Denies easy bruising or bleeding    PHYSICAL EXAM:   Friendly White female, in no acute distress; alert and oriented to person, place and time.  VITALS: There were no vitals taken for this visit.   HENT: Normocephalic, without obvious abnormality.   EYES: Sclerae anicteric.  NECK: No obvious masses.  CARDIOVASCULAR: No peripheral edema, per patient report.  RESPIRATORY: Normal respiratory effort.   GI: No abdominal distention.  EXTREMITIES: No peripheral edema.  SKIN: No jaundice. No rashes noted to exposed skin.  NEURO: No asterixis.  PSYCH:  Memory intact. Thought and speech pattern appropriate. Behavior normal. No depression or anxiety noted.    DIAGNOSTIC STUDIES:    LIVER BIOPSY 10/27/2020:        MRI ABDOMEN W/W/O CONTRAST WITH MRCP 6/8/2022:    COMPARISON:     No MR or CT comparison.     FINDINGS:     Normal liver morphology without parenchymal signal alteration on in and   opposed phase images.  Subcentimeter cyst located high within the dome   of hepatic segment VIII.  Two adjacent oval well-circumscribed T2   hyperintense masses measuring 1.5 and 1.2 cm respectively located   within hepatic segment VII each show peripheral discontinuous nodular    enhancement, similar to that of blood pool and continuous centripetal   enhancement on portal venous and delayed phases, most compatible with   hemangiomas.  Spleen and adrenal glands are normal.  No focal MR   abnormality of the pancreas.  Post cholecystectomy.     Mild central intrahepatic biliary ductal prominence and mild dilatation   of the common hepatic duct measuring up to 0.8 cm in greatest diameter.   Gradual tapering to normal caliber distal common bile duct.  No MR   evidence of filling defect or biliary stricture above the level of the   ampulla.  No evidence of main pancreatic ductal dilatation.     Normal renal enhancement without hydronephrosis.  Few small simple   renal cysts.     Nonspecific borderline in size stephanie hepatis lymph node.  No enlarged   lymph nodes within the included abdomen.  No acute vascular abnormality.     No focal MR abnormality of the stomach allowing for limitations related   to relative underdistention.  No MR evidence of acute bowel abnormality   within the included abdomen.  No evidence of free intraperitoneal fluid.    IMPRESSION:   MILD CENTRAL INTRAHEPATIC BILIARY DUCTAL PROMINENCE AND MILD DILATATION   OF THE COMMON HEPATIC DUCT WITH GRADUAL TAPERING TO NORMAL CALIBER   DISTAL COMMON BILE DUCT.  FINDINGS ARE AT LEAST IN PART THOUGHT TO BE   RELATED TO RESERVOIR EFFECT POST CHOLECYSTECTOMY.  IF AMPULLARY   STRICTURE IS SUSPECTED THEN ERCP MAY BE CONSIDERED FOR FURTHER   EVALUATION.     NO MR EVIDENCE OF CHOLEDOCHOLITHIASIS OR BILIARY STRICTURE ABOVE THE   LEVEL OF THE AMPULLA.     TWO ADJACENT SMALL HEMANGIOMAS WITHIN HEPATIC SEGMENT VII.    EUS 7/29/2022:      Findings:          ENDOSONOGRAPHIC FINDING:        The esophagus, stomach and duodenum and adjacent structures were        visualized endosonographically.        Endosonographic imaging in the visualized portion of the liver        showed no lesion.        There was no sign of significant endosonographic  abnormality in the        pancreatic head.        There was no sign of significant endosonographic abnormality in the        common bile duct. The maximum diameter of the duct was 6 mm. No        stones, no biliary sludge, ducts of normal caliber and ducts with        regular contour were identified.   Impression:    - There was no sign of significant pathology in                          the pancreatic head.                          - There was no sign of significant pathology in                          the common bile duct.                          - No EUS evidence for biliary obstruction.                          - No specimens collected.     US ABDOMEN COMPLETE 12/8/2022:    FINDINGS:     The visualized aorta is unremarkable.   The IVC is unremarkable.     The liver measures 13.3 cm in length, normal.  Lobulated echogenic   adjacent lesions are once again demonstrated in segment 7, compatible   with known hemangiomas.  No additional mass is visualized.  Slightly   coarsened echotexture is present without evidence of contour nodularity.     The portal vein and IVC are patent.   There is no free peritoneal fluid within the abdomen detected.     The pancreas visualized has a normal appearance.     The gallbladder is surgically absent.   The common duct measures 3 mm.     The right kidney images demonstrate adequate corticomedullary   differentiation and normal Color Doppler evaluation. The right kidney   measures 9.5 cm in length.     The left kidney images demonstrate adequate corticomedullary   differentiation and normal Color Doppler evaluation. The left kidney   measures 9.4 cm cm in length.     The spleen measures 11 cm.     IMPRESSION:   1. Echogenic lesions within the liver correspond with known   hemangiomas.  There is no new hepatic lesion.   2. Coarsened echotexture of the liver suggests sequela of chronic   hepatocellular disease.  There is no evidence of steatosis or cirrhosis.    US ABDOMEN  10/10/2023:    FINDINGS:     LIVER:   Size: 13.5 cm at midclavicular line   Echogenicity: Mildly coarsened   Surface nodularity: None   Mass: There are 2 closely adjacent echogenic lesions within the right   lobe of the liver with an aggregate diameter of 2 cm.  No new liver   lesion is noted.   Portal vein: Normal     GALLBLADDER:  Surgically absent   Stones:  None       BILE DUCTS:   Intrahepatic ducts: Normal   Extrahepatic ducts:  4 mm     PANCREAS:   Head and uncinate process: Normal   Body and tail: Normal     SPLEEN:   10 cm     RIGHT KIDNEY: 3 cm simple cyst   Length:  10 cm   Hydronephrosis:  None     LEFT KIDNEY: 2 cm simple cyst   Length:  9.5 cm   Hydronephrosis:  None     AORTA AND IVC:  Visualized segments are normal     Ascites:  None     US ABDOMEN 12/20/2023:    FINDINGS:     Aorta: Atherosclerosis without aneurysm.   Inferior vena cava: Normal.     Liver: Measures 13 cm.  Normal echogenicity and echotexture.  2   adjacent 1.6 cm echogenic lesions are identified in the right liver,   unchanged, and correspond to previously identified hemangiomas.     Free fluid: None.     Portal vein: Patent with normal waveform and hepatopetal flow.     Pancreas: Imaged portions are within normal limits.     Gallbladder: Absent.   Sonographic Grant sign: Negative/absent.     Common bile duct: Measures 0.7 cm. No evidence of choledocholithiasis.   No intrahepatic ductal dilation.     Right kidney: Measures 9.5 cm.  Normal corticomedullary distinction and   color flow. Small nonobstructing nephrolith.  No hydronephrosis.     Left kidney: Measures 8.9 cm.  Normal corticomedullary distinction and   color flow. Small nonobstructing nephrolith.  No hydronephrosis.     Spleen: Measures 7.6 cm.  Normal echogenicity.     Other: None.     EGD 5/20/2024:    Findings:       The examined esophagus was normal.        The Z-line was regular and was found 37 cm from the incisors.        A medium-sized hiatal hernia was present.         Diffuse moderate inflammation characterized by congestion (edema),        erosions and erythema was found in the gastric body and in the        gastric antrum. Biopsies were taken with a cold forceps for        histology.        The examined duodenum was normal.   Impression:    - Normal esophagus.                          - Z-line regular, 37 cm from the incisors.                          - Medium-sized hiatal hernia.                          - Gastritis. Biopsied.                          - Normal examined duodenum.     US ABDOMEN 6/24/2024:    FINDINGS:     The visualized aorta is unremarkable.   The IVC is unremarkable.     The liver measures up to 15.0 cm.  2 adjacent right hepatic lobe   hemangiomas are unchanged     The portal vein, IVC, and splenic vein gated duplex doppler waveform   and color flow analysis suggests antegrade flow.   There is no free peritoneal fluid within the abdomen detected.     The pancreas visualized has a normal appearance.     The gallbladder is surgically absent.     The right kidney images demonstrate adequate corticomedullary   differentiation and normal Color Doppler evaluation. The right kidney   measures 10.0 cm in length.     The left kidney images demonstrate adequate corticomedullary   differentiation and normal Color Doppler evaluation. The left kidney   measures 9.0 cm in length.     The spleen measures 9.0 cm.     ASSESSMENT & PLAN:  63 y.o. White female with:    1. Primary biliary cholangitis  ursodioL (ACTIGALL) 500 MG tablet    hydrOXYzine pamoate (VISTARIL) 25 MG Cap    FibroScan Transplant Hepatology(Vibration Controlled Transient Elastography)    AFP Tumor Marker    Comprehensive Metabolic Panel    CBC Auto Differential    Protime-INR    US Abdomen Complete    AFP Tumor Marker    Comprehensive Metabolic Panel    CBC Auto Differential    Protime-INR    US Abdomen Complete    DXA Bone Density Axial Skeleton 1 or more sites    DXA Bone Density Axial Skeleton 1  or more sites      2. Advanced hepatic fibrosis  ursodioL (ACTIGALL) 500 MG tablet    FibroScan Transplant Hepatology(Vibration Controlled Transient Elastography)    AFP Tumor Marker    Comprehensive Metabolic Panel    CBC Auto Differential    Protime-INR    US Abdomen Complete    AFP Tumor Marker    Comprehensive Metabolic Panel    CBC Auto Differential    Protime-INR    US Abdomen Complete    DXA Bone Density Axial Skeleton 1 or more sites    DXA Bone Density Axial Skeleton 1 or more sites      3. Pruritus  hydrOXYzine pamoate (VISTARIL) 25 MG Cap    AFP Tumor Marker    Comprehensive Metabolic Panel    CBC Auto Differential    Protime-INR    US Abdomen Complete    AFP Tumor Marker    Comprehensive Metabolic Panel    CBC Auto Differential    Protime-INR    US Abdomen Complete      4. Hepatic hemangioma  AFP Tumor Marker    Comprehensive Metabolic Panel    CBC Auto Differential    Protime-INR    US Abdomen Complete    AFP Tumor Marker    Comprehensive Metabolic Panel    CBC Auto Differential    Protime-INR    US Abdomen Complete      5. Metabolic dysfunction-associated steatotic liver disease (MASLD)  FibroScan Transplant Hepatology(Vibration Controlled Transient Elastography)    AFP Tumor Marker    Comprehensive Metabolic Panel    CBC Auto Differential    Protime-INR    US Abdomen Complete    AFP Tumor Marker    Comprehensive Metabolic Panel    CBC Auto Differential    Protime-INR    US Abdomen Complete      6. Hyperlipidemia, unspecified hyperlipidemia type  FibroScan Transplant Hepatology(Vibration Controlled Transient Elastography)    AFP Tumor Marker    Comprehensive Metabolic Panel    CBC Auto Differential    Protime-INR    US Abdomen Complete    AFP Tumor Marker    Comprehensive Metabolic Panel    CBC Auto Differential    Protime-INR    US Abdomen Complete      7. Hypertension, unspecified type  FibroScan Transplant Hepatology(Vibration Controlled Transient Elastography)    AFP Tumor Marker    Comprehensive  Metabolic Panel    CBC Auto Differential    Protime-INR    US Abdomen Complete    AFP Tumor Marker    Comprehensive Metabolic Panel    CBC Auto Differential    Protime-INR    US Abdomen Complete      8. Encounter for follow-up surveillance of liver cancer  AFP Tumor Marker    Comprehensive Metabolic Panel    CBC Auto Differential    Protime-INR    US Abdomen Complete    AFP Tumor Marker    Comprehensive Metabolic Panel    CBC Auto Differential    Protime-INR    US Abdomen Complete        - Continue Ursodiol 500 mg PO BID for the treatment of PBC.  - Repeat labs in 6 months to monitor liver function (external orders mailed to patient's address on file).  - If ALP remains above 135 on next labs, will add on adjunctive treatment of Ocaliva 5 mg PO daily.  - Recommend abdominal ultrasound, with AFP measurement for HCC surveillance every 6 months, next due 12/2024 (external orders mailed to patient's address on file).  - Recommend Fibroscan every 2-3 years to non-invasively restage liver disease.  - Recommend TSH and lipids be checked annually through PCP.  - Recommend DEXA scan every 2-3 years to monitor BMD (external orders mailed to patient's address on file).  - Maintain a healthy weight, through diet and exercise.  - Recommend good control of blood pressure, blood sugar and lipids.   - Avoid alcohol and herbal supplements/alternative remedies.  - Refill for Hydroxyzine sent to local pharmacy to be used PRN for itching.   - Continue follow up with GI, as planned.  - Return to clinic in 6 months, with labs, US, DXA scan and Fibroscan prior to visit.    Thank you for allowing me to participate in the care of Desirae Etienne       Hepatology Nurse Practitioner  [facility]    CC'ed note to:   No ref. provider found  Ruth Mathias MD

## 2024-07-01 NOTE — TELEPHONE ENCOUNTER
Returned call to patient to advise that after speaking with the pharmacy I have an explanation. She received 90 bills on 5/13 from a previous order for once daily.On 5/20 a new order was give for twice daily. Patient has taken previous order twice daily and is out of medication. Advised that the pharmacy has new order for twice daily and she will need to resolve the issue with them.

## 2024-07-02 ENCOUNTER — TELEPHONE (OUTPATIENT)
Dept: HEPATOLOGY | Facility: CLINIC | Age: 64
End: 2024-07-02
Payer: MEDICARE

## 2024-07-02 NOTE — TELEPHONE ENCOUNTER
Spoke with patient. Scheduled fibroscan and follow up visit in January 2025. Mailed appointment reminder to patient.

## 2024-07-02 NOTE — TELEPHONE ENCOUNTER
----- Message from Neha Hall NP sent at 7/2/2024  9:25 AM CDT -----  Please contact patient to arrange a f/u visit with me in person in [place] in 6 months with Fibroscan prior to the visit. I have mailed external orders to the patient for labs, US and DEXA scan to be done prior to her next visit with me. Please remind the patient to do the testing prior to her next visit. Thanks!

## 2024-09-24 ENCOUNTER — PATIENT MESSAGE (OUTPATIENT)
Dept: GASTROENTEROLOGY | Facility: CLINIC | Age: 64
End: 2024-09-24
Payer: MEDICARE

## 2024-09-24 ENCOUNTER — TELEPHONE (OUTPATIENT)
Dept: HEPATOLOGY | Facility: CLINIC | Age: 64
End: 2024-09-24
Payer: MEDICARE

## 2024-09-24 ENCOUNTER — TELEPHONE (OUTPATIENT)
Dept: GASTROENTEROLOGY | Facility: CLINIC | Age: 64
End: 2024-09-24
Payer: MEDICARE

## 2024-09-24 DIAGNOSIS — K58.1 IRRITABLE BOWEL SYNDROME WITH CONSTIPATION: Primary | ICD-10-CM

## 2024-09-24 NOTE — TELEPHONE ENCOUNTER
----- Message from Michelle Bishop sent at 9/24/2024 10:20 AM CDT -----  Regarding: Medication  Contact: Desirae  Consult/Advisory     Name Of Caller:Desirae Etienne          Contact Preference:  [phone] (Mobile)         Nature of call:Pt states the medication that she was given may not be working, she also does not have any refills. Please advise. Requesting a call back.        Lazest  290 mg ???

## 2024-09-24 NOTE — TELEPHONE ENCOUNTER
"Called patient back who states that "Linzess is not working." She mentions that she had a BM yesterday but it was hard and she is experiencing cramping (5 out of 10 on pain scale) with no complaint of nausea. Patient states she is also out of refills for med. Please advise.   "

## 2024-09-24 NOTE — TELEPHONE ENCOUNTER
----- Message from Peg Khanna NP sent at 9/24/2024 12:58 PM CDT -----  Please let the patient know it is recommended to send Ibsrela to our [facility].  Prescription has been sent to [facility].    Thank you,  CHIN Ruvalcaba

## 2024-09-24 NOTE — TELEPHONE ENCOUNTER
----- Message from Michelle Bishop sent at 9/24/2024 10:13 AM CDT -----  Regarding: Consult/Advisory/ labs results  Contact: Desirae  Consult/Advisory     Name Of Caller:Desirae Etienne          Contact Preference:[phone] (Mobile)        Nature of call:Pt is calling to get lab results  from June 2024, Pt states she is gaining weight and is constipated. Also found two cyst, on your spine Please advise. Requesting a call back.

## 2024-09-24 NOTE — TELEPHONE ENCOUNTER
Spoke with patient. She states she need a copy of her records, patient also states she has a Cyst on her spine. Informed patient she would have to call Medical records regarding records request and informed patient she would have to reach out to PCP for referral to Neurosurgery for cyst on spine. Patient states she already see Pain management and states they referred her to Neurosurgery, patient verbalized understanding.

## 2024-09-28 DIAGNOSIS — K58.1 IRRITABLE BOWEL SYNDROME WITH CONSTIPATION: ICD-10-CM

## 2024-09-30 ENCOUNTER — PATIENT MESSAGE (OUTPATIENT)
Dept: GASTROENTEROLOGY | Facility: CLINIC | Age: 64
End: 2024-09-30
Payer: MEDICARE

## 2024-09-30 ENCOUNTER — TELEPHONE (OUTPATIENT)
Dept: GASTROENTEROLOGY | Facility: CLINIC | Age: 64
End: 2024-09-30
Payer: MEDICARE

## 2024-09-30 DIAGNOSIS — K58.1 IRRITABLE BOWEL SYNDROME WITH CONSTIPATION: ICD-10-CM

## 2024-09-30 PROBLEM — Z85.05 ENCOUNTER FOR FOLLOW-UP SURVEILLANCE OF LIVER CANCER: Status: RESOLVED | Noted: 2024-07-01 | Resolved: 2024-09-30

## 2024-09-30 PROBLEM — Z08 ENCOUNTER FOR FOLLOW-UP SURVEILLANCE OF LIVER CANCER: Status: RESOLVED | Noted: 2024-07-01 | Resolved: 2024-09-30

## 2024-09-30 RX ORDER — LINACLOTIDE 290 UG/1
CAPSULE, GELATIN COATED ORAL
Qty: 30 CAPSULE | Refills: 1 | Status: SHIPPED | OUTPATIENT
Start: 2024-09-30

## 2024-09-30 NOTE — TELEPHONE ENCOUNTER
Called patient back who states she prefer med is sent to [facility] at [address] in [place] instead of [facility] as she is out of med and need it sooner than mail order can deliver.

## 2024-10-01 DIAGNOSIS — K58.1 IRRITABLE BOWEL SYNDROME WITH CONSTIPATION: ICD-10-CM

## 2024-10-01 NOTE — TELEPHONE ENCOUNTER
Called patient to make her aware that per NP Peg Khanna in a conversation yesterday only a 30 day supply of Linzess was sent to [facility] and that per my conversation with Margarita at [employer] [facility], rep is working on trying to get Ibsrela approved. Patient states that she will call [facility] and ask for Linzess to be transferred to [facility]. Patient verbalized understanding for now NP is pursuing option with assistance from [employer] regarding Ibsrela. No further action required.

## 2024-10-01 NOTE — TELEPHONE ENCOUNTER
Received call from [employer] [facility] speaking with Letitia stating that their office will be assisting patient with acquiring Ibsrela. Provided [facility] pharmacy telephone number as she asked. No further action required at this point.

## 2024-10-16 ENCOUNTER — PATIENT MESSAGE (OUTPATIENT)
Dept: GASTROENTEROLOGY | Facility: CLINIC | Age: 64
End: 2024-10-16
Payer: MEDICARE

## 2024-10-18 ENCOUNTER — TELEPHONE (OUTPATIENT)
Dept: GASTROENTEROLOGY | Facility: CLINIC | Age: 64
End: 2024-10-18
Payer: MEDICARE

## 2024-10-18 DIAGNOSIS — K58.1 IRRITABLE BOWEL SYNDROME WITH CONSTIPATION: ICD-10-CM

## 2024-10-18 NOTE — TELEPHONE ENCOUNTER
Upon receiving signed Rx for tenapanor 50 mg tab quantity 90 fron NP Peg Khanna. Faxed to [employer] [facility].

## 2024-12-12 ENCOUNTER — PATIENT MESSAGE (OUTPATIENT)
Dept: HEPATOLOGY | Facility: CLINIC | Age: 64
End: 2024-12-12
Payer: COMMERCIAL

## 2024-12-16 DIAGNOSIS — L29.9 PRURITUS: ICD-10-CM

## 2024-12-16 DIAGNOSIS — K74.3 PRIMARY BILIARY CHOLANGITIS: ICD-10-CM

## 2024-12-16 RX ORDER — HYDROXYZINE PAMOATE 25 MG/1
CAPSULE ORAL
Qty: 180 CAPSULE | Refills: 0 | Status: SHIPPED | OUTPATIENT
Start: 2024-12-16

## 2025-01-03 ENCOUNTER — OFFICE VISIT (OUTPATIENT)
Dept: HEPATOLOGY | Facility: CLINIC | Age: 65
End: 2025-01-03
Payer: COMMERCIAL

## 2025-01-03 ENCOUNTER — PATIENT MESSAGE (OUTPATIENT)
Dept: GASTROENTEROLOGY | Facility: CLINIC | Age: 65
End: 2025-01-03
Payer: COMMERCIAL

## 2025-01-03 ENCOUNTER — PROCEDURE VISIT (OUTPATIENT)
Dept: HEPATOLOGY | Facility: CLINIC | Age: 65
End: 2025-01-03
Payer: COMMERCIAL

## 2025-01-03 VITALS — HEIGHT: 67 IN | WEIGHT: 158.31 LBS | BODY MASS INDEX: 24.85 KG/M2

## 2025-01-03 DIAGNOSIS — Z87.19 HISTORY OF FATTY INFILTRATION OF LIVER: ICD-10-CM

## 2025-01-03 DIAGNOSIS — K74.00 HEPATIC FIBROSIS: ICD-10-CM

## 2025-01-03 DIAGNOSIS — D18.03 HEPATIC HEMANGIOMA: ICD-10-CM

## 2025-01-03 DIAGNOSIS — K74.02 ADVANCED HEPATIC FIBROSIS: ICD-10-CM

## 2025-01-03 DIAGNOSIS — R74.8 ELEVATED ALKALINE PHOSPHATASE LEVEL: ICD-10-CM

## 2025-01-03 DIAGNOSIS — K74.3 PRIMARY BILIARY CHOLANGITIS: Primary | ICD-10-CM

## 2025-01-03 DIAGNOSIS — E78.5 HYPERLIPIDEMIA, UNSPECIFIED HYPERLIPIDEMIA TYPE: ICD-10-CM

## 2025-01-03 DIAGNOSIS — Z08 ENCOUNTER FOR FOLLOW-UP SURVEILLANCE OF LIVER CANCER: ICD-10-CM

## 2025-01-03 DIAGNOSIS — Z85.05 ENCOUNTER FOR FOLLOW-UP SURVEILLANCE OF LIVER CANCER: ICD-10-CM

## 2025-01-03 DIAGNOSIS — K76.0 METABOLIC DYSFUNCTION-ASSOCIATED STEATOTIC LIVER DISEASE (MASLD): ICD-10-CM

## 2025-01-03 DIAGNOSIS — K58.1 IRRITABLE BOWEL SYNDROME WITH CONSTIPATION: ICD-10-CM

## 2025-01-03 DIAGNOSIS — K74.3 PRIMARY BILIARY CHOLANGITIS: ICD-10-CM

## 2025-01-03 DIAGNOSIS — L29.9 PRURITUS: ICD-10-CM

## 2025-01-03 DIAGNOSIS — I10 HYPERTENSION, UNSPECIFIED TYPE: ICD-10-CM

## 2025-01-03 PROCEDURE — 99999 PR PBB SHADOW E&M-EST. PATIENT-LVL III: CPT | Mod: PBBFAC,,, | Performed by: NURSE PRACTITIONER

## 2025-01-03 RX ORDER — PRIMIDONE 50 MG/1
TABLET ORAL
COMMUNITY
Start: 2024-11-02

## 2025-01-03 RX ORDER — SELADELPAR LYSINE 10 MG/1
10 CAPSULE ORAL DAILY
Qty: 30 CAPSULE | Refills: 11 | Status: SHIPPED | OUTPATIENT
Start: 2025-01-03

## 2025-01-03 RX ORDER — CHOLESTYRAMINE 4 G/9G
4 POWDER, FOR SUSPENSION ORAL
Qty: 270 PACKET | Refills: 3 | Status: SHIPPED | OUTPATIENT
Start: 2025-01-03 | End: 2026-01-03

## 2025-01-03 NOTE — PROGRESS NOTES
"[facility] VISIT ESTABLISHED PT NOTE    REFERRING PROVIDER:  No ref. provider found    CHIEF COMPLAINT: PBC    HPI: This is a 64 y.o. White female with PMH noted below, presenting for follow up. She was last seen in clinic by myself in 7/2024.    She has a history of fatty liver with stage 3 fibrosis noted on liver biopsy in 10/2020 (no hepatic steatosis noted on path report). The liver biopsy was done during laproscopic cholecystectomy on 10/27/2020, and showed chronic portal inflammation with interface activity, with bridging fibrosis. Serological work up was negative for autoimmune and viral etiologies. Labs continued to show normal liver function, with a persistently elevated alkaline phosphatase (> 400); her transaminases are normal. She also had an incidental finding of hemangiomas on cross sectional imaging.    She underwent MRI of the Abdomen with MRCP in June 2022 for further evaluation of chronically elevated Alk Phos. Imaging showed "mild central intrahepatic biliary ductal prominence and mild dilatation of the common hepatic duct, with gradual tapering to normal caliber distal common bile duct", with no evidence of a filling defect or biliary stricture above the level of the ampulla, and no evidence of main pancreatic ductal dilatation. The radiologist recommended further evaluation with ERCP if ampullary stricture is suspected. The MRI also showed 2 small hepatic hemangiomas (1.5 and 1.2 cm, respectively) in segment VII. EUS was done in late July 2022 and showed no gallstones, bile duct strictures or significant pathology in the CBD or pancreatic duct.     Repeat labs in 8/2022 showed an ALP of 760, with normal transaminases and intact synthetic function. ACE level normal. CA 19-9 and AFP were also normal. AMA was positive (127.2), consistent with diagnosis of PBC. Labs also showed that she has not been exposed to, and is not immune to Hepatitis A or B. She has started vaccination series " for Hepatitis A and B, and has received 2 vaccinations thus far. She was started on Ursodiol 500 mg PO BID in 8/2022, and has tolerated treatment well.     Follow up labs in September, and then again in December 2022 showed significant improvement in ALP (250's); blood counts, kidney and liver function tests remain normal/stable. Most recent LFT's in 12/2024 showed a persistently elevated ALP level of 175; the rest of her LFT's are normal.     Per patient report, she has lost 30 lbs over the past 2 years, through dietary changes. BMI is now 24. B/P and lipids are well controlled on current regimen. Last Abdominal US in 12/2024 (done at [facility]) for HCC surveillance showed no concerning liver lesions; hepatic hemangiomas remain stable in size. AFP is normal.     She is now followed by [facility] for multiple GI issues. She underwent EGD and Colonoscopy in 5/2024. EGD showed no signs of portal hypertension, as below:    Findings:       The examined esophagus was normal.        The Z-line was regular and was found 37 cm from the incisors.        A medium-sized hiatal hernia was present.        Diffuse moderate inflammation characterized by congestion (edema),        erosions and erythema was found in the gastric body and in the        gastric antrum. Biopsies were taken with a cold forceps for        histology.        The examined duodenum was normal.   Impression:     - Normal esophagus.                          - Z-line regular, 37 cm from the incisors.                          - Medium-sized hiatal hernia.                          - Gastritis. Biopsied.                          - Normal examined duodenum.     Fibroscan today to non-invasively restage her liver disease was suggestive of no significant fatty infiltration of the liver (<S1), with F2 fibrosis (moderate scarring), and a low likelihood of cirrhosis.    She is well appearing, and has no signs or symptoms of hepatic decompensation including  "jaundice, dark urine, abdominal distention, lower extremity edema, hematemesis, melena, or periods of confusion suggestive of hepatic encephalopathy. Further ROS as below.     Review of patient's allergies indicates:  No Known Allergies  Current Outpatient Medications on File Prior to Visit   Medication Sig Dispense Refill    atorvastatin (LIPITOR) 20 MG tablet Take 20 mg by mouth once daily.      furosemide (LASIX) 20 MG tablet Take 20 mg by mouth daily as needed.      galcanezumab-gnlm (EMGALITY PEN) 120 mg/mL PnIj 120 mg.      hydrOXYzine pamoate (VISTARIL) 25 MG Cap TAKE 1 CAPSULE BY MOUTH EVERY 8 HOURS AS NEEDED *MAY CAUSE DROWSINESS* "THANK YOU!" 180 capsule 0    lisinopriL (PRINIVIL,ZESTRIL) 5 MG tablet Take 5 mg by mouth.      omeprazole (PRILOSEC) 40 MG capsule Take 1 capsule (40 mg total) by mouth 2 (two) times daily before meals. 180 capsule 3    oxyCODONE (ROXICODONE) 10 mg Tab immediate release tablet Take 10 mg by mouth 2 (two) times daily as needed.      pregabalin (LYRICA) 150 MG capsule Take 150 mg by mouth 2 (two) times daily.      primidone (MYSOLINE) 50 MG Tab Take by mouth.      QUEtiapine (SEROQUEL) 100 MG Tab Take 100 mg by mouth every evening.      tiZANidine (ZANAFLEX) 4 MG tablet Take 4 mg by mouth every evening.      ursodioL (ACTIGALL) 500 MG tablet Take 1 tablet (500 mg total) by mouth 2 (two) times daily. 180 tablet 3    diazePAM (VALIUM) 5 MG tablet Take 5 mg by mouth daily as needed. (Patient not taking: Reported on 1/3/2025)      [DISCONTINUED] carBAMazepine (CARBATROL) 100 MG CM12 Take 100 mg by mouth 2 (two) times a day. (Patient not taking: Reported on 1/3/2025)      [DISCONTINUED] QUEtiapine (SEROQUEL) 50 MG tablet Take 50 mg by mouth every evening.      [DISCONTINUED] tenapanor 50 mg Tab Take 1 tablet by mouth 2 (two) times daily. 90 tablet 0    [DISCONTINUED] zonisamide (ZONEGRAN) 25 MG Cap Take 25 mg by mouth once daily. (Patient not taking: Reported on 1/3/2025)       No " "current facility-administered medications on file prior to visit.     PMHX:  has a past medical history of Advanced hepatic fibrosis, Arthritis, Chronic back pain, Fatty liver, Fibromyalgia, GERD (gastroesophageal reflux disease), HTN (hypertension), and Primary biliary cholangitis.    PSHX:  has a past surgical history that includes Laparoscopic cholecystectomy with cholangiography (10/27/2020); Liver biopsy (10/27/2020); Total knee arthroplasty; Endoscopic ultrasound of upper gastrointestinal tract (N/A, 07/29/2022); ERCP (N/A, 07/29/2022); Cholecystectomy; Appendectomy; Esophagogastroduodenoscopy (N/A, 5/20/2024); and Colonoscopy (N/A, 6/3/2024).    FAMILY HISTORY: Negative for liver disease, reviewed in [facility]    SOCIAL HISTORY:   Social History     Tobacco Use   Smoking Status Every Day    Current packs/day: 1.00    Types: Cigarettes   Smokeless Tobacco Not on file     Social History     Substance and Sexual Activity   Alcohol Use Never     Social History     Substance and Sexual Activity   Drug Use Never     ROS:   GENERAL: Denies fever, chills, weight loss/gain, + fatigue + dry mouth  HEENT: Denies headaches, dizziness, vision/hearing changes  CARDIOVASCULAR: Denies chest pain, palpitations, or edema  RESPIRATORY: Denies dyspnea, cough  GI: + Chronic GI issues, including GERD, diarrhea, constipation - followed by GI.   : Denies dysuria, hematuria   SKIN: Denies rash, + pruritus  NEURO: Denies confusion, memory loss, or mood changes  PSYCH: Denies depression or anxiety  HEME/LYMPH: Denies easy bruising or bleeding    PHYSICAL EXAM:   Friendly White female, in no acute distress; alert and oriented to person, place and time.  VITALS: Ht 5' 7" (1.702 m)   Wt 71.8 kg (158 lb 4.6 oz)   BMI 24.79 kg/m²    HENT: Normocephalic, without obvious abnormality.   EYES: Sclerae anicteric.  NECK: No obvious masses.  CARDIOVASCULAR: No peripheral edema.  RESPIRATORY: Normal respiratory effort.   GI: No abdominal " distention.  EXTREMITIES: No peripheral edema.  SKIN: No jaundice. No rashes noted to exposed skin.  NEURO: No asterixis.  PSYCH:  Memory intact. Thought and speech pattern appropriate. Behavior normal. No depression or anxiety noted.    DIAGNOSTIC STUDIES:    LIVER BIOPSY 10/27/2020:        MRI ABDOMEN W/W/O CONTRAST WITH MRCP 6/8/2022:    COMPARISON:     No MR or CT comparison.     FINDINGS:     Normal liver morphology without parenchymal signal alteration on in and   opposed phase images.  Subcentimeter cyst located high within the dome   of hepatic segment VIII.  Two adjacent oval well-circumscribed T2   hyperintense masses measuring 1.5 and 1.2 cm respectively located   within hepatic segment VII each show peripheral discontinuous nodular   enhancement, similar to that of blood pool and continuous centripetal   enhancement on portal venous and delayed phases, most compatible with   hemangiomas.  Spleen and adrenal glands are normal.  No focal MR   abnormality of the pancreas.  Post cholecystectomy.     Mild central intrahepatic biliary ductal prominence and mild dilatation   of the common hepatic duct measuring up to 0.8 cm in greatest diameter.   Gradual tapering to normal caliber distal common bile duct.  No MR   evidence of filling defect or biliary stricture above the level of the   ampulla.  No evidence of main pancreatic ductal dilatation.     Normal renal enhancement without hydronephrosis.  Few small simple   renal cysts.     Nonspecific borderline in size stephanie hepatis lymph node.  No enlarged   lymph nodes within the included abdomen.  No acute vascular abnormality.     No focal MR abnormality of the stomach allowing for limitations related   to relative underdistention.  No MR evidence of acute bowel abnormality   within the included abdomen.  No evidence of free intraperitoneal fluid.    IMPRESSION:   MILD CENTRAL INTRAHEPATIC BILIARY DUCTAL PROMINENCE AND MILD DILATATION   OF THE COMMON HEPATIC  DUCT WITH GRADUAL TAPERING TO NORMAL CALIBER   DISTAL COMMON BILE DUCT.  FINDINGS ARE AT LEAST IN PART THOUGHT TO BE   RELATED TO RESERVOIR EFFECT POST CHOLECYSTECTOMY.  IF AMPULLARY   STRICTURE IS SUSPECTED THEN ERCP MAY BE CONSIDERED FOR FURTHER   EVALUATION.     NO MR EVIDENCE OF CHOLEDOCHOLITHIASIS OR BILIARY STRICTURE ABOVE THE   LEVEL OF THE AMPULLA.     TWO ADJACENT SMALL HEMANGIOMAS WITHIN HEPATIC SEGMENT VII.    EUS 7/29/2022:      Findings:          ENDOSONOGRAPHIC FINDING:        The esophagus, stomach and duodenum and adjacent structures were        visualized endosonographically.        Endosonographic imaging in the visualized portion of the liver        showed no lesion.        There was no sign of significant endosonographic abnormality in the        pancreatic head.        There was no sign of significant endosonographic abnormality in the        common bile duct. The maximum diameter of the duct was 6 mm. No        stones, no biliary sludge, ducts of normal caliber and ducts with        regular contour were identified.   Impression:    - There was no sign of significant pathology in                          the pancreatic head.                          - There was no sign of significant pathology in                          the common bile duct.                          - No EUS evidence for biliary obstruction.                          - No specimens collected.     US ABDOMEN COMPLETE 12/8/2022:    FINDINGS:     The visualized aorta is unremarkable.   The IVC is unremarkable.     The liver measures 13.3 cm in length, normal.  Lobulated echogenic   adjacent lesions are once again demonstrated in segment 7, compatible   with known hemangiomas.  No additional mass is visualized.  Slightly   coarsened echotexture is present without evidence of contour nodularity.     The portal vein and IVC are patent.   There is no free peritoneal fluid within the abdomen detected.     The pancreas visualized has a  normal appearance.     The gallbladder is surgically absent.   The common duct measures 3 mm.     The right kidney images demonstrate adequate corticomedullary   differentiation and normal Color Doppler evaluation. The right kidney   measures 9.5 cm in length.     The left kidney images demonstrate adequate corticomedullary   differentiation and normal Color Doppler evaluation. The left kidney   measures 9.4 cm cm in length.     The spleen measures 11 cm.     IMPRESSION:   1. Echogenic lesions within the liver correspond with known   hemangiomas.  There is no new hepatic lesion.   2. Coarsened echotexture of the liver suggests sequela of chronic   hepatocellular disease.  There is no evidence of steatosis or cirrhosis.    US ABDOMEN 10/10/2023:    FINDINGS:     LIVER:   Size: 13.5 cm at midclavicular line   Echogenicity: Mildly coarsened   Surface nodularity: None   Mass: There are 2 closely adjacent echogenic lesions within the right   lobe of the liver with an aggregate diameter of 2 cm.  No new liver   lesion is noted.   Portal vein: Normal     GALLBLADDER:  Surgically absent   Stones:  None       BILE DUCTS:   Intrahepatic ducts: Normal   Extrahepatic ducts:  4 mm     PANCREAS:   Head and uncinate process: Normal   Body and tail: Normal     SPLEEN:   10 cm     RIGHT KIDNEY: 3 cm simple cyst   Length:  10 cm   Hydronephrosis:  None     LEFT KIDNEY: 2 cm simple cyst   Length:  9.5 cm   Hydronephrosis:  None     AORTA AND IVC:  Visualized segments are normal     Ascites:  None     US ABDOMEN 12/20/2023:    FINDINGS:     Aorta: Atherosclerosis without aneurysm.   Inferior vena cava: Normal.     Liver: Measures 13 cm.  Normal echogenicity and echotexture.  2   adjacent 1.6 cm echogenic lesions are identified in the right liver,   unchanged, and correspond to previously identified hemangiomas.     Free fluid: None.     Portal vein: Patent with normal waveform and hepatopetal flow.     Pancreas: Imaged portions are  within normal limits.     Gallbladder: Absent.   Sonographic Grant sign: Negative/absent.     Common bile duct: Measures 0.7 cm. No evidence of choledocholithiasis.   No intrahepatic ductal dilation.     Right kidney: Measures 9.5 cm.  Normal corticomedullary distinction and   color flow. Small nonobstructing nephrolith.  No hydronephrosis.     Left kidney: Measures 8.9 cm.  Normal corticomedullary distinction and   color flow. Small nonobstructing nephrolith.  No hydronephrosis.     Spleen: Measures 7.6 cm.  Normal echogenicity.     Other: None.     EGD 5/20/2024:    Findings:       The examined esophagus was normal.        The Z-line was regular and was found 37 cm from the incisors.        A medium-sized hiatal hernia was present.        Diffuse moderate inflammation characterized by congestion (edema),        erosions and erythema was found in the gastric body and in the        gastric antrum. Biopsies were taken with a cold forceps for        histology.        The examined duodenum was normal.   Impression:    - Normal esophagus.                          - Z-line regular, 37 cm from the incisors.                          - Medium-sized hiatal hernia.                          - Gastritis. Biopsied.                          - Normal examined duodenum.     US ABDOMEN 6/24/2024:    FINDINGS:     The visualized aorta is unremarkable.   The IVC is unremarkable.     The liver measures up to 15.0 cm.  2 adjacent right hepatic lobe   hemangiomas are unchanged     The portal vein, IVC, and splenic vein gated duplex doppler waveform   and color flow analysis suggests antegrade flow.   There is no free peritoneal fluid within the abdomen detected.     The pancreas visualized has a normal appearance.     The gallbladder is surgically absent.     The right kidney images demonstrate adequate corticomedullary   differentiation and normal Color Doppler evaluation. The right kidney   measures 10.0 cm in length.     The left  kidney images demonstrate adequate corticomedullary   differentiation and normal Color Doppler evaluation. The left kidney   measures 9.0 cm in length.     The spleen measures 9.0 cm.     US ABDOMEN 12/20/2024:      FINDINGS:     LIVER:   Size: 12.5 cm at midclavicular line   Echogenicity: Increased and coarsened   Surface nodularity: None   Mass: A pair closely adjacent 1.5 cm hemangiomas right lobe are seen   and are stable.   Portal vein: Normal     GALLBLADDER:  Surgically absent   Stones:  None     BILE DUCTS:   Intrahepatic ducts: Normal   Extrahepatic ducts:  4 mm     PANCREAS:   Head and uncinate process: Normal   Body and tail: Not seen     SPLEEN:   9 cm     RIGHT KIDNEY: A 2 cm Bosniak I cyst midpole is noted.   Length:  10 cm   Hydronephrosis:  None     LEFT KIDNEY: Normal   Length:  8.5 cm   Hydronephrosis:  None     AORTA AND IVC:  Visualized segments are normal     Ascites:  None     IMPRESSION:   1. The liver is steatotic but no suspicious imaging abnormality or   interval changes noted.   2. A pair of 1.5 cm hemangiomas within the right lobe are unchanged.   3. Previous cholecystectomy     FIBROSCAN 1/3/2025:    Findings  Median liver stiffness score:  7.8  CAP Reading: dB/m:  216     IQR/med %:  5  Interpretation  Fibrosis interpretation is based on medial liver stiffness - Kilopascal (kPa).     Fibrosis Stage:  F2  Steatosis interpretation is based on controlled attenuation parameter - (dB/m).     Steatosis Grade:  <S1     ASSESSMENT & PLAN:  64 y.o. White female with:    1. Primary biliary cholangitis  seladelpar (LIVDELZI) 10 mg Cap    Hepatic Function Panel    Hepatic Function Panel      2. Hepatic fibrosis  seladelpar (LIVDELZI) 10 mg Cap    Hepatic Function Panel    Hepatic Function Panel      3. History of fatty infiltration of liver        4. Pruritus  cholestyramine (QUESTRAN) 4 gram packet      5. Hepatic hemangioma        6. Encounter for follow-up surveillance of liver cancer        7.  Elevated alkaline phosphatase level  seladelpar (LIVDELZI) 10 mg Cap        - Continue Ursodiol 500 mg PO BID.  - Given persistently elevated ALP levels - start Livdelzi 10 mg PO daily as an adjunctive treatment for PBC.  - Repeat labs in 3 months after the start of treatment to monitor liver function (external orders mailed to patient's address on file).  - Recommend abdominal ultrasound, with AFP measurement for HCC surveillance every 6 months, next due 6/2025.  - Recommend Fibroscan every 2-3 years to non-invasively restage liver disease.  - Recommend TSH and lipids be checked annually through PCP.  - Recommend DEXA scan every 2-3 years to monitor BMD (will obtain recent D[facility] scan done at [facility] for review).  - Maintain a healthy weight, through diet and exercise.  - Recommend good control of blood pressure, blood sugar and lipids.   - Avoid alcohol and herbal supplements/alternative remedies.  - Start cholestyramine 1 packet PO TID PRN pruritus.   - Continue Hydroxyzine PRN for itching.   - Continue follow up with GI, as planned.  - Return to clinic in 6 months, with labs and US prior to visit.    Thank you for allowing me to participate in the care of Desirae Tatyana Etienen       Hepatology Nurse Practitioner  [facility]    CC'ed note to:   No ref. provider found  Ruth Mathias MD

## 2025-01-03 NOTE — PROCEDURES
FibroScan Transplant Hepatology(Vibration Controlled Transient Elastography)    Date/Time: 1/3/2025 2:00 PM    Performed by: Neha Hall NP  Authorized by: Neha Hall NP    Diagnosis:  Cholestatic    Probe:  M    Universal Protocol: Patient's identity, procedure and site were verified, confirmatory pause was performed.  Discussed procedure including risks and potential complications.  Questions answered.  Patient verbalizes understanding and wishes to proceed with VCTE.     Procedure: After providing explanations of the procedure, patient was placed in the supine position with right arm in maximum abduction to allow optimal exposure of right lateral abdomen.  Patient was briefly assessed, Testing was performed in the mid-axillary location, 50Hz Shear Wave pulses were applied and the resulting Shear Wave and Propagation Speed detected with a 3.5 MHz ultrasonic signal, using the FibroScan probe, Skin to liver capsule distance and liver parenchyma were accessed during the entire examination with the FibroScan probe, Patient was instructed to breathe normally and to abstain from sudden movements during the procedure, allowing for random measurements of liver stiffness. At least 10 Shear Waves were produced, Individual measurements of each Shear Wave were calculated.  Patient tolerated the procedure well with no complications.  Meets discharge criteria as was dismissed.  Rates pain 0 out of 10.  Patient will follow up with ordering provider to review results.    Findings  Median liver stiffness score:  7.8  CAP Reading: dB/m:  216    IQR/med %:  5  Interpretation  Fibrosis interpretation is based on medial liver stiffness - Kilopascal (kPa).    Fibrosis Stage:  F2  Steatosis interpretation is based on controlled attenuation parameter - (dB/m).    Steatosis Grade:  <S1

## 2025-01-06 ENCOUNTER — PATIENT MESSAGE (OUTPATIENT)
Dept: HEPATOLOGY | Facility: CLINIC | Age: 65
End: 2025-01-06
Payer: COMMERCIAL

## 2025-01-09 DIAGNOSIS — K74.3 PRIMARY BILIARY CHOLANGITIS: Primary | ICD-10-CM

## 2025-01-09 RX ORDER — ELAFIBRANOR 80 MG/1
80 TABLET, FILM COATED ORAL DAILY
Qty: 30 TABLET | Refills: 11 | Status: SHIPPED | OUTPATIENT
Start: 2025-01-09

## 2025-01-30 ENCOUNTER — TELEPHONE (OUTPATIENT)
Dept: GASTROENTEROLOGY | Facility: CLINIC | Age: 65
End: 2025-01-30
Payer: COMMERCIAL

## 2025-01-30 NOTE — TELEPHONE ENCOUNTER
Spoke with Patient she states needs refill and PA done on her Ibsrela since starting Ibsrela in October it is working and having regular bowel movements and not having the abdominal pain. Patient wants to continue the medication as prescribed.

## 2025-02-18 ENCOUNTER — PATIENT MESSAGE (OUTPATIENT)
Dept: GASTROENTEROLOGY | Facility: CLINIC | Age: 65
End: 2025-02-18
Payer: COMMERCIAL

## 2025-02-27 ENCOUNTER — TELEPHONE (OUTPATIENT)
Dept: HEPATOLOGY | Facility: CLINIC | Age: 65
End: 2025-02-27
Payer: COMMERCIAL

## 2025-02-27 DIAGNOSIS — K74.3 PRIMARY BILIARY CHOLANGITIS: Primary | ICD-10-CM

## 2025-02-27 DIAGNOSIS — R74.8 ELEVATED ALKALINE PHOSPHATASE LEVEL: ICD-10-CM

## 2025-02-27 NOTE — TELEPHONE ENCOUNTER
----- Message from Neha Hall NP sent at 2/27/2025 10:30 AM CST -----  Please contact patient to schedule f/u labs in 3-4 months, along with a clinic visit with me a few days later (in person or virtual visit okay). Thanks!

## 2025-03-05 ENCOUNTER — PATIENT MESSAGE (OUTPATIENT)
Dept: ADMINISTRATIVE | Facility: OTHER | Age: 65
End: 2025-03-05
Payer: COMMERCIAL

## 2025-03-18 ENCOUNTER — PATIENT MESSAGE (OUTPATIENT)
Dept: GASTROENTEROLOGY | Facility: CLINIC | Age: 65
End: 2025-03-18
Payer: COMMERCIAL

## 2025-03-18 DIAGNOSIS — K58.1 IRRITABLE BOWEL SYNDROME WITH CONSTIPATION: ICD-10-CM

## 2025-03-27 ENCOUNTER — TELEPHONE (OUTPATIENT)
Dept: HEPATOLOGY | Facility: CLINIC | Age: 65
End: 2025-03-27
Payer: COMMERCIAL

## 2025-03-27 NOTE — TELEPHONE ENCOUNTER
----- Message from Neha Hall NP sent at 3/26/2025  7:16 PM CDT -----  Regarding: FW: Authorization  Contact: Pt  [phone]  Please call to obtain more information. Ursodiol has never required a Prior authorization in the past. Thanks!  ----- Message -----  From: Lorne Montiel MA  Sent: 3/25/2025   3:39 PM CDT  To: Neha Hall NP  Subject: FW: Authorization                                  ----- Message -----  From: Ambika Marie  Sent: 3/25/2025   3:32 PM CDT  To: Abhijit Solomon Staff  Subject: Authorization                                    Name of Pharmacy: [facility] - [place] - [id number] [facility] [id number]: [phone] Fax: [phone] Of caller: Desirae  Name of Medication:  ursodioL (ACTIGALL) 500 MG tabletComments/advisory:  Requesting office call to help resolve [facility] authorization issues [phone]

## 2025-03-31 ENCOUNTER — PATIENT MESSAGE (OUTPATIENT)
Dept: GASTROENTEROLOGY | Facility: CLINIC | Age: 65
End: 2025-03-31
Payer: COMMERCIAL

## 2025-04-03 ENCOUNTER — PATIENT MESSAGE (OUTPATIENT)
Dept: ADMINISTRATIVE | Facility: OTHER | Age: 65
End: 2025-04-03
Payer: COMMERCIAL

## 2025-04-21 DIAGNOSIS — K74.3 PRIMARY BILIARY CHOLANGITIS: ICD-10-CM

## 2025-04-21 DIAGNOSIS — L29.9 PRURITUS: ICD-10-CM

## 2025-04-21 RX ORDER — HYDROXYZINE PAMOATE 25 MG/1
CAPSULE ORAL
Qty: 180 CAPSULE | Refills: 0 | Status: SHIPPED | OUTPATIENT
Start: 2025-04-21 | End: 2025-04-21 | Stop reason: SDUPTHER

## 2025-04-21 RX ORDER — HYDROXYZINE PAMOATE 25 MG/1
25 CAPSULE ORAL EVERY 8 HOURS PRN
Qty: 180 CAPSULE | Refills: 0 | Status: SHIPPED | OUTPATIENT
Start: 2025-04-21

## 2025-05-01 ENCOUNTER — PATIENT MESSAGE (OUTPATIENT)
Dept: ADMINISTRATIVE | Facility: OTHER | Age: 65
End: 2025-05-01
Payer: COMMERCIAL

## 2025-05-02 ENCOUNTER — TELEPHONE (OUTPATIENT)
Dept: HEPATOLOGY | Facility: CLINIC | Age: 65
End: 2025-05-02
Payer: COMMERCIAL

## 2025-05-02 NOTE — TELEPHONE ENCOUNTER
----- Message from Neha Hall NP sent at 5/2/2025  2:02 PM CDT -----  Regarding: RE: Iqirvo RX prior auth  Thanks Devante. We had sent her external orders previously for Hepatic Function Panel. Lorne - can you please call patient and see if she still has the paper order, and can go into [email or web address] for lab draw now? Let her know to contact us, once she has the lab drawn to be on the lookout for the results. Thanks!  ----- Message -----  From: Devante Solis, PharmD  Sent: 5/2/2025   1:56 PM CDT  To: Neha Hall NP  Subject: Iqirvo RX prior auth                             Good afternoon, Prior auth for Iqirvo could only be extended to 5/29/25.Her insurance plan is requesting for updated lab for LFT result to extend approval.I believe her appt with you is not until 6/27 but patient inform [facility] she can obtain lab locally prior to appt? Please advisAurelia

## 2025-05-02 NOTE — TELEPHONE ENCOUNTER
Spoke with patient. She stated that she have the lab orders on hand and she will do her lab work next week.

## 2025-05-16 ENCOUNTER — PATIENT MESSAGE (OUTPATIENT)
Dept: HEPATOLOGY | Facility: CLINIC | Age: 65
End: 2025-05-16
Payer: COMMERCIAL

## 2025-05-16 NOTE — PROGRESS NOTES
Recent labs reviewed, as below, which show marked improvement in ALP level on Iqirvo.        Plan: Continue Ursodiol 500 mg PO Daily, along with Iqirvo 80 mg daily, and follow up in clinic as scheduled in June.       Hepatology Nurse Practitioner  [facility] [place]

## 2025-05-19 DIAGNOSIS — K74.02 ADVANCED HEPATIC FIBROSIS: ICD-10-CM

## 2025-05-19 DIAGNOSIS — K74.3 PRIMARY BILIARY CHOLANGITIS: ICD-10-CM

## 2025-05-19 RX ORDER — URSODIOL 500 MG/1
500 TABLET, FILM COATED ORAL 2 TIMES DAILY
Qty: 180 TABLET | Refills: 3 | Status: SHIPPED | OUTPATIENT
Start: 2025-05-19 | End: 2026-05-19

## 2025-05-21 ENCOUNTER — TELEPHONE (OUTPATIENT)
Dept: HEPATOLOGY | Facility: CLINIC | Age: 65
End: 2025-05-21
Payer: COMMERCIAL

## 2025-05-21 NOTE — TELEPHONE ENCOUNTER
"----- Message from Petey sent at 5/21/2025 11:23 AM CDT -----  Regarding: RX  Consult/Advisory:  Name Of Caller: Self Contact Preference?:[phone] What is the nature of the call?: Calling to speak w/  someone in regards to needing a PA FOR ursodioL (KAMERON FORTE) 500 MG tablet REQUESTING CALL BACK  Additional Notes:"Thank you for all that you do for our patients"  "

## 2025-05-21 NOTE — TELEPHONE ENCOUNTER
Returned call to patient. Informed her that we have received the PA request. Once its signed we will fax it back to the pharmacy. Patient verbalized understanding.

## 2025-05-22 ENCOUNTER — TELEPHONE (OUTPATIENT)
Dept: HEPATOLOGY | Facility: CLINIC | Age: 65
End: 2025-05-22
Payer: COMMERCIAL

## 2025-05-22 NOTE — TELEPHONE ENCOUNTER
Prior authorization form for Ursodiol, along with recent labs and chart notes faxed to [employer].       Hepatology Nurse Practitioner  [facility] [place]

## 2025-06-23 ENCOUNTER — PATIENT MESSAGE (OUTPATIENT)
Dept: GASTROENTEROLOGY | Facility: CLINIC | Age: 65
End: 2025-06-23
Payer: COMMERCIAL

## 2025-06-23 ENCOUNTER — TELEPHONE (OUTPATIENT)
Dept: GASTROENTEROLOGY | Facility: CLINIC | Age: 65
End: 2025-06-23
Payer: COMMERCIAL

## 2025-06-23 DIAGNOSIS — K21.9 GASTROESOPHAGEAL REFLUX DISEASE, UNSPECIFIED WHETHER ESOPHAGITIS PRESENT: ICD-10-CM

## 2025-06-23 RX ORDER — OMEPRAZOLE 40 MG/1
40 CAPSULE, DELAYED RELEASE ORAL
Qty: 180 CAPSULE | Refills: 3 | Status: SHIPPED | OUTPATIENT
Start: 2025-06-23 | End: 2026-06-23

## 2025-06-23 NOTE — TELEPHONE ENCOUNTER
Spoke with patient. Schedule appointment 7/1 at 3pm with Peg Khanna NP.   ----- Message from Ioana sent at 6/23/2025  1:23 PM CDT -----  Contact: patient  Type:  Needs Medical Advice    Who Called: Patient      Would the patient rather a call back or a response via [facility]? Call    Best Call Back Number: [phone]    Additional Information: Patient states that she unable to drive but needs to have a one year check up appt. Please call to advise

## 2025-06-27 ENCOUNTER — OFFICE VISIT (OUTPATIENT)
Dept: HEPATOLOGY | Facility: CLINIC | Age: 65
End: 2025-06-27
Payer: COMMERCIAL

## 2025-06-27 VITALS — HEIGHT: 67 IN | BODY MASS INDEX: 23.88 KG/M2 | WEIGHT: 152.13 LBS

## 2025-06-27 DIAGNOSIS — L29.9 PRURITUS: ICD-10-CM

## 2025-06-27 DIAGNOSIS — K74.02 ADVANCED HEPATIC FIBROSIS: ICD-10-CM

## 2025-06-27 DIAGNOSIS — E78.5 HYPERLIPIDEMIA, UNSPECIFIED HYPERLIPIDEMIA TYPE: ICD-10-CM

## 2025-06-27 DIAGNOSIS — Z85.05 ENCOUNTER FOR FOLLOW-UP SURVEILLANCE OF LIVER CANCER: ICD-10-CM

## 2025-06-27 DIAGNOSIS — M85.80 OSTEOPENIA, UNSPECIFIED LOCATION: ICD-10-CM

## 2025-06-27 DIAGNOSIS — Z87.19 HISTORY OF FATTY INFILTRATION OF LIVER: ICD-10-CM

## 2025-06-27 DIAGNOSIS — D18.03 HEPATIC HEMANGIOMA: ICD-10-CM

## 2025-06-27 DIAGNOSIS — I10 HYPERTENSION, UNSPECIFIED TYPE: ICD-10-CM

## 2025-06-27 DIAGNOSIS — Z08 ENCOUNTER FOR FOLLOW-UP SURVEILLANCE OF LIVER CANCER: ICD-10-CM

## 2025-06-27 DIAGNOSIS — K74.3 PRIMARY BILIARY CHOLANGITIS: Primary | ICD-10-CM

## 2025-06-27 PROBLEM — G43.709 CHRONIC MIGRAINE WITHOUT AURA: Status: ACTIVE | Noted: 2025-06-27

## 2025-06-27 PROCEDURE — 99999 PR PBB SHADOW E&M-EST. PATIENT-LVL III: CPT | Mod: PBBFAC,,, | Performed by: NURSE PRACTITIONER

## 2025-06-27 RX ORDER — QUETIAPINE FUMARATE 100 MG/1
TABLET, FILM COATED ORAL
COMMUNITY
Start: 2025-01-14

## 2025-06-27 RX ORDER — ASPIRIN 81 MG/1
TABLET, FILM COATED ORAL
COMMUNITY
Start: 2025-05-20 | End: 2025-06-27 | Stop reason: ALTCHOICE

## 2025-06-27 RX ORDER — PRIMIDONE 250 MG/1
TABLET ORAL
COMMUNITY

## 2025-06-27 RX ORDER — ONDANSETRON 4 MG/1
4 TABLET, FILM COATED ORAL EVERY 8 HOURS PRN
COMMUNITY
Start: 2025-05-20

## 2025-06-27 RX ORDER — HYDROXYZINE PAMOATE 25 MG/1
25 CAPSULE ORAL EVERY 8 HOURS PRN
Qty: 180 CAPSULE | Refills: 1 | Status: SHIPPED | OUTPATIENT
Start: 2025-06-27

## 2025-06-27 RX ORDER — POLYETHYLENE GLYCOL 3350 17 G/17G
17 POWDER, FOR SOLUTION ORAL 2 TIMES DAILY
COMMUNITY

## 2025-06-27 RX ORDER — OXYCODONE AND ACETAMINOPHEN 10; 325 MG/1; MG/1
1 TABLET ORAL 4 TIMES DAILY PRN
COMMUNITY
Start: 2025-05-20

## 2025-06-27 RX ORDER — TENAPANOR HYDROCHLORIDE 53.2 MG/1
50 TABLET ORAL
COMMUNITY
Start: 2025-01-29

## 2025-06-27 NOTE — PROGRESS NOTES
"[facility] VISIT ESTABLISHED PT NOTE    REFERRING PROVIDER:  No ref. provider found    CHIEF COMPLAINT: PBC    HPI: This is a 64 y.o. White female with PMH noted below, presenting for follow up. She was last seen in clinic by myself in 1/2025.    She has a history of fatty liver with stage 3 fibrosis noted on liver biopsy in 10/2020 (no hepatic steatosis noted on path report). The liver biopsy was done during laproscopic cholecystectomy on 10/27/2020, and showed chronic portal inflammation with interface activity, with bridging fibrosis. Serological work up was negative for autoimmune and viral etiologies. Labs continued to show normal liver function, with a persistently elevated alkaline phosphatase (> 400); her transaminases are normal. She also had an incidental finding of hemangiomas on cross sectional imaging.    She underwent MRI of the Abdomen with MRCP in June 2022 for further evaluation of chronically elevated Alk Phos. Imaging showed "mild central intrahepatic biliary ductal prominence and mild dilatation of the common hepatic duct, with gradual tapering to normal caliber distal common bile duct", with no evidence of a filling defect or biliary stricture above the level of the ampulla, and no evidence of main pancreatic ductal dilatation. The radiologist recommended further evaluation with ERCP if ampullary stricture is suspected. The MRI also showed 2 small hepatic hemangiomas (1.5 and 1.2 cm, respectively) in segment VII. EUS was done in late July 2022 and showed no gallstones, bile duct strictures or significant pathology in the CBD or pancreatic duct.     Repeat labs in 8/2022 showed an ALP of 760, with normal transaminases and intact synthetic function. ACE level normal. CA 19-9 and AFP were also normal. AMA was positive (127.2), consistent with diagnosis of PBC. Labs also showed that she has not been exposed to, and is not immune to Hepatitis A or B. She has started vaccination series " for Hepatitis A and B, and has received 2 vaccinations thus far. She was started on Ursodiol 500 mg PO BID in 8/2022, and has tolerated treatment well.     Follow up labs in September, and then again in December 2022 showed significant improvement in ALP (250's); blood counts, kidney and liver function tests remain normal/stable. Most recent LFT's in 12/2024 showed a persistently elevated ALP level of 175; the rest of her LFT's are normal.     Per patient report, she has lost 30 lbs over the past 2 years, through dietary changes. BMI is now 23. B/P and lipids are well controlled on current regimen. Last Abdominal US in 12/2024 (done at [facility]) for HCC surveillance showed no concerning liver lesions; hepatic hemangiomas remain stable in size. AFP normal. DEXA scan showed osteopenia. She takes Ca/Vit. D.     She is now followed by [facility] for multiple GI issues. She underwent EGD and Colonoscopy in 5/2024. EGD showed no signs of portal hypertension, as below:    Findings:       The examined esophagus was normal.        The Z-line was regular and was found 37 cm from the incisors.        A medium-sized hiatal hernia was present.        Diffuse moderate inflammation characterized by congestion (edema),        erosions and erythema was found in the gastric body and in the        gastric antrum. Biopsies were taken with a cold forceps for        histology.        The examined duodenum was normal.   Impression:     - Normal esophagus.                          - Z-line regular, 37 cm from the incisors.                          - Medium-sized hiatal hernia.                          - Gastritis. Biopsied.                          - Normal examined duodenum.     Fibroscan in 1/2025 to non-invasively restage her liver disease was suggestive of no significant fatty infiltration of the liver (<S1), with F2 fibrosis (moderate scarring), and a low likelihood of cirrhosis.    Due to persistently elevated ALP, the  patient was started on adjunctive treatment for PBC with Iqirvo 80 mg daily. She has tolerated treatment well, and ALP has normalized (129).    She is well appearing, and has no signs or symptoms of hepatic decompensation including jaundice, dark urine, abdominal distention, lower extremity edema, hematemesis, melena, or periods of confusion suggestive of hepatic encephalopathy. Further ROS as below.     Review of patient's allergies indicates:  No Known Allergies  Current Outpatient Medications on File Prior to Visit   Medication Sig Dispense Refill    atorvastatin (LIPITOR) 20 MG tablet Take 20 mg by mouth once daily.      calcium-vitamin D3 (CALCIUM 500 + D) 500 mg-5 mcg (200 unit) per tablet Take 1 tablet by mouth 2 (two) times daily with meals.      cholestyramine (QUESTRAN) 4 gram packet Take 1 packet (4 g total) by mouth every meal as needed. 270 packet 3    diazePAM (VALIUM) 5 MG tablet Take 5 mg by mouth daily as needed.      elafibranor (IQIRVO) 80 mg Tab Take 1 tablet (80 mg) by mouth once daily. 30 tablet 11    furosemide (LASIX) 20 MG tablet Take 20 mg by mouth daily as needed.      galcanezumab-gnlm (EMGALITY PEN) 120 mg/mL PnIj 120 mg.      hydrOXYzine pamoate (VISTARIL) 25 MG Cap Take 1 capsule (25 mg total) by mouth every 8 (eight) hours as needed (Itching). 180 capsule 0    lisinopriL (PRINIVIL,ZESTRIL) 5 MG tablet Take 5 mg by mouth.      omeprazole (PRILOSEC) 40 MG capsule Take 1 capsule (40 mg total) by mouth 2 (two) times daily before meals. 180 capsule 3    ondansetron (ZOFRAN) 4 MG tablet Take 4 mg by mouth every 8 (eight) hours as needed.      oxyCODONE-acetaminophen (PERCOCET)  mg per tablet Take 1 tablet by mouth 4 (four) times daily as needed.      polyethylene glycol (GLYCOLAX) 17 gram/dose powder Take 17 g by mouth 2 (two) times daily.      pregabalin (LYRICA) 150 MG capsule Take 150 mg by mouth 2 (two) times daily.      primidone (MYSOLINE) 250 MG Tab Take by mouth.       "QUEtiapine (SEROQUEL) 100 MG Tab = 1 tab, Oral, HS, "THANK YOU., # 90 tab, 1 Refill(s), Maintenance, Pharmacy: [facility], 171, cm, 01/14/25 8:26:00 CST, Height/Length Measured, 65.7, kg, 07/30/24 14:06:00 CDT, Weight Dosing      tenapanor (IBSRELA) 50 mg Tab 50 mg.      tiZANidine (ZANAFLEX) 4 MG tablet Take 4 mg by mouth every evening.      ursodioL (KAMERON FORTE) 500 MG tablet Take 1 tablet (500 mg total) by mouth 2 (two) times daily. 180 tablet 3    [DISCONTINUED] ADULT ASPIRIN REGIMEN 81 mg EC tablet Take by mouth.      [DISCONTINUED] primidone (MYSOLINE) 50 MG Tab Take by mouth.      [DISCONTINUED] QUEtiapine (SEROQUEL) 100 MG Tab Take 100 mg by mouth every evening.      [DISCONTINUED] oxyCODONE (ROXICODONE) 10 mg Tab immediate release tablet Take 10 mg by mouth 2 (two) times daily as needed.      [DISCONTINUED] tenapanor 50 mg Tab Take 1 tablet by mouth 2 (two) times daily. 60 tablet 11     No current facility-administered medications on file prior to visit.     PMHX:  has a past medical history of Advanced hepatic fibrosis, Arthritis, Chronic back pain, Fatty liver, Fibromyalgia, GERD (gastroesophageal reflux disease), HTN (hypertension), and Primary biliary cholangitis.    PSHX:  has a past surgical history that includes Laparoscopic cholecystectomy with cholangiography (10/27/2020); Liver biopsy (10/27/2020); Total knee arthroplasty; Endoscopic ultrasound of upper gastrointestinal tract (N/A, 07/29/2022); ERCP (N/A, 07/29/2022); Cholecystectomy; Appendectomy; Esophagogastroduodenoscopy (N/A, 5/20/2024); and Colonoscopy (N/A, 6/3/2024).    FAMILY HISTORY: Negative for liver disease, reviewed in EPIC    SOCIAL HISTORY:   Social History     Tobacco Use   Smoking Status Every Day    Current packs/day: 1.00    Types: Cigarettes   Smokeless Tobacco Not on file     Social History     Substance and Sexual Activity   Alcohol Use Never     Social History     Substance and Sexual Activity   Drug Use " "Never     ROS:   GENERAL: Denies fever, chills, weight loss/gain, + fatigue + dry mouth  HEENT: Denies headaches, dizziness, vision/hearing changes  CARDIOVASCULAR: Denies chest pain, palpitations, or edema  RESPIRATORY: Denies dyspnea, cough  GI: + Chronic GI issues, including GERD, diarrhea, constipation - followed by GI.   : Denies dysuria, hematuria   SKIN: Denies rash, + pruritus - stable  NEURO: Denies confusion, memory loss, or mood changes  PSYCH: Denies depression or anxiety  HEME/LYMPH: Denies easy bruising or bleeding    PHYSICAL EXAM:   Friendly White female, in no acute distress; alert and oriented to person, place and time.  VITALS: Ht 5' 7" (1.702 m)   Wt 69 kg (152 lb 1.9 oz)   BMI 23.82 kg/m²    HENT: Normocephalic, without obvious abnormality.   EYES: Sclerae anicteric.  NECK: No obvious masses.  CARDIOVASCULAR: No peripheral edema.  RESPIRATORY: Normal respiratory effort.   GI: No abdominal distention.  EXTREMITIES: No peripheral edema.  SKIN: No jaundice. No rashes noted to exposed skin.  NEURO: No asterixis.  PSYCH:  Memory intact. Thought and speech pattern appropriate. Behavior normal. No depression or anxiety noted.    DIAGNOSTIC STUDIES:    LIVER BIOPSY 10/27/2020:        MRI ABDOMEN W/W/O CONTRAST WITH MRCP 6/8/2022:    COMPARISON:     No MR or CT comparison.     FINDINGS:     Normal liver morphology without parenchymal signal alteration on in and   opposed phase images.  Subcentimeter cyst located high within the dome   of hepatic segment VIII.  Two adjacent oval well-circumscribed T2   hyperintense masses measuring 1.5 and 1.2 cm respectively located   within hepatic segment VII each show peripheral discontinuous nodular   enhancement, similar to that of blood pool and continuous centripetal   enhancement on portal venous and delayed phases, most compatible with   hemangiomas.  Spleen and adrenal glands are normal.  No focal MR   abnormality of the pancreas.  Post cholecystectomy. "     Mild central intrahepatic biliary ductal prominence and mild dilatation   of the common hepatic duct measuring up to 0.8 cm in greatest diameter.   Gradual tapering to normal caliber distal common bile duct.  No MR   evidence of filling defect or biliary stricture above the level of the   ampulla.  No evidence of main pancreatic ductal dilatation.     Normal renal enhancement without hydronephrosis.  Few small simple   renal cysts.     Nonspecific borderline in size stephanie hepatis lymph node.  No enlarged   lymph nodes within the included abdomen.  No acute vascular abnormality.     No focal MR abnormality of the stomach allowing for limitations related   to relative underdistention.  No MR evidence of acute bowel abnormality   within the included abdomen.  No evidence of free intraperitoneal fluid.    IMPRESSION:   MILD CENTRAL INTRAHEPATIC BILIARY DUCTAL PROMINENCE AND MILD DILATATION   OF THE COMMON HEPATIC DUCT WITH GRADUAL TAPERING TO NORMAL CALIBER   DISTAL COMMON BILE DUCT.  FINDINGS ARE AT LEAST IN PART THOUGHT TO BE   RELATED TO RESERVOIR EFFECT POST CHOLECYSTECTOMY.  IF AMPULLARY   STRICTURE IS SUSPECTED THEN ERCP MAY BE CONSIDERED FOR FURTHER   EVALUATION.     NO MR EVIDENCE OF CHOLEDOCHOLITHIASIS OR BILIARY STRICTURE ABOVE THE   LEVEL OF THE AMPULLA.     TWO ADJACENT SMALL HEMANGIOMAS WITHIN HEPATIC SEGMENT VII.    EUS 7/29/2022:      Findings:          ENDOSONOGRAPHIC FINDING:        The esophagus, stomach and duodenum and adjacent structures were        visualized endosonographically.        Endosonographic imaging in the visualized portion of the liver        showed no lesion.        There was no sign of significant endosonographic abnormality in the        pancreatic head.        There was no sign of significant endosonographic abnormality in the        common bile duct. The maximum diameter of the duct was 6 mm. No        stones, no biliary sludge, ducts of normal caliber and ducts with         regular contour were identified.   Impression:    - There was no sign of significant pathology in                          the pancreatic head.                          - There was no sign of significant pathology in                          the common bile duct.                          - No EUS evidence for biliary obstruction.                          - No specimens collected.     US ABDOMEN COMPLETE 12/8/2022:    FINDINGS:     The visualized aorta is unremarkable.   The IVC is unremarkable.     The liver measures 13.3 cm in length, normal.  Lobulated echogenic   adjacent lesions are once again demonstrated in segment 7, compatible   with known hemangiomas.  No additional mass is visualized.  Slightly   coarsened echotexture is present without evidence of contour nodularity.     The portal vein and IVC are patent.   There is no free peritoneal fluid within the abdomen detected.     The pancreas visualized has a normal appearance.     The gallbladder is surgically absent.   The common duct measures 3 mm.     The right kidney images demonstrate adequate corticomedullary   differentiation and normal Color Doppler evaluation. The right kidney   measures 9.5 cm in length.     The left kidney images demonstrate adequate corticomedullary   differentiation and normal Color Doppler evaluation. The left kidney   measures 9.4 cm cm in length.     The spleen measures 11 cm.     IMPRESSION:   1. Echogenic lesions within the liver correspond with known   hemangiomas.  There is no new hepatic lesion.   2. Coarsened echotexture of the liver suggests sequela of chronic   hepatocellular disease.  There is no evidence of steatosis or cirrhosis.    US ABDOMEN 10/10/2023:    FINDINGS:     LIVER:   Size: 13.5 cm at midclavicular line   Echogenicity: Mildly coarsened   Surface nodularity: None   Mass: There are 2 closely adjacent echogenic lesions within the right   lobe of the liver with an aggregate diameter of 2 cm.  No new liver    lesion is noted.   Portal vein: Normal     GALLBLADDER:  Surgically absent   Stones:  None       BILE DUCTS:   Intrahepatic ducts: Normal   Extrahepatic ducts:  4 mm     PANCREAS:   Head and uncinate process: Normal   Body and tail: Normal     SPLEEN:   10 cm     RIGHT KIDNEY: 3 cm simple cyst   Length:  10 cm   Hydronephrosis:  None     LEFT KIDNEY: 2 cm simple cyst   Length:  9.5 cm   Hydronephrosis:  None     AORTA AND IVC:  Visualized segments are normal     Ascites:  None     US ABDOMEN 12/20/2023:    FINDINGS:     Aorta: Atherosclerosis without aneurysm.   Inferior vena cava: Normal.     Liver: Measures 13 cm.  Normal echogenicity and echotexture.  2   adjacent 1.6 cm echogenic lesions are identified in the right liver,   unchanged, and correspond to previously identified hemangiomas.     Free fluid: None.     Portal vein: Patent with normal waveform and hepatopetal flow.     Pancreas: Imaged portions are within normal limits.     Gallbladder: Absent.   Sonographic Grant sign: Negative/absent.     Common bile duct: Measures 0.7 cm. No evidence of choledocholithiasis.   No intrahepatic ductal dilation.     Right kidney: Measures 9.5 cm.  Normal corticomedullary distinction and   color flow. Small nonobstructing nephrolith.  No hydronephrosis.     Left kidney: Measures 8.9 cm.  Normal corticomedullary distinction and   color flow. Small nonobstructing nephrolith.  No hydronephrosis.     Spleen: Measures 7.6 cm.  Normal echogenicity.     Other: None.     EGD 5/20/2024:    Findings:       The examined esophagus was normal.        The Z-line was regular and was found 37 cm from the incisors.        A medium-sized hiatal hernia was present.        Diffuse moderate inflammation characterized by congestion (edema),        erosions and erythema was found in the gastric body and in the        gastric antrum. Biopsies were taken with a cold forceps for        histology.        The examined duodenum was normal.    Impression:    - Normal esophagus.                          - Z-line regular, 37 cm from the incisors.                          - Medium-sized hiatal hernia.                          - Gastritis. Biopsied.                          - Normal examined duodenum.     US ABDOMEN 6/24/2024:    FINDINGS:     The visualized aorta is unremarkable.   The IVC is unremarkable.     The liver measures up to 15.0 cm.  2 adjacent right hepatic lobe   hemangiomas are unchanged     The portal vein, IVC, and splenic vein gated duplex doppler waveform   and color flow analysis suggests antegrade flow.   There is no free peritoneal fluid within the abdomen detected.     The pancreas visualized has a normal appearance.     The gallbladder is surgically absent.     The right kidney images demonstrate adequate corticomedullary   differentiation and normal Color Doppler evaluation. The right kidney   measures 10.0 cm in length.     The left kidney images demonstrate adequate corticomedullary   differentiation and normal Color Doppler evaluation. The left kidney   measures 9.0 cm in length.     The spleen measures 9.0 cm.     US ABDOMEN 12/20/2024:      FINDINGS:     LIVER:   Size: 12.5 cm at midclavicular line   Echogenicity: Increased and coarsened   Surface nodularity: None   Mass: A pair closely adjacent 1.5 cm hemangiomas right lobe are seen   and are stable.   Portal vein: Normal     GALLBLADDER:  Surgically absent   Stones:  None     BILE DUCTS:   Intrahepatic ducts: Normal   Extrahepatic ducts:  4 mm     PANCREAS:   Head and uncinate process: Normal   Body and tail: Not seen     SPLEEN:   9 cm     RIGHT KIDNEY: A 2 cm Bosniak I cyst midpole is noted.   Length:  10 cm   Hydronephrosis:  None     LEFT KIDNEY: Normal   Length:  8.5 cm   Hydronephrosis:  None     AORTA AND IVC:  Visualized segments are normal     Ascites:  None     IMPRESSION:   1. The liver is steatotic but no suspicious imaging abnormality or   interval changes noted.    2. A pair of 1.5 cm hemangiomas within the right lobe are unchanged.   3. Previous cholecystectomy     DEXA Scan 12/20/2024:    FINDINGS:     L-spine: The bone mineral density (BMD) calculated for the total L1,   L2, L3, and L4 AP lumbar spine is 1.245 g/cm2, with a T-score of +0.4,   and a Z-score of +2.0.     Right Femoral Neck: The BMD calculation is 0.992 g/cm2, with a T-score   of -0.3, and a Z-score of +1.1.     Right Total Hip: The BMD calculation is 0.867 g/cm2, with a T-score of   -1.1, and a Z-score of +0.1.     Left Femoral Neck: The BMD calculation is 0.929 g/cm2, with a T-score   of -0.8, and a Z-score of +0.7.     Left Total Hip: The BMD calculation is 0.873 g/cm2, with a T-score of   -1.1, and a Z-score of +0.1.     DEXA SUMMARY: The patient's lowest T-score calculation is between -1.0   and -2.5.     IMPRESSION:     WHO CLASSIFICATION: Low bone mass (Osteopenia).     FIBROSCAN 1/3/2025:    Findings  Median liver stiffness score:  7.8  CAP Reading: dB/m:  216     IQR/med %:  5  Interpretation  Fibrosis interpretation is based on medial liver stiffness - Kilopascal (kPa).     Fibrosis Stage:  F2  Steatosis interpretation is based on controlled attenuation parameter - (dB/m).     Steatosis Grade:  <S1     ASSESSMENT & PLAN:  64 y.o. White female with:    1. Primary biliary cholangitis  hydrOXYzine pamoate (VISTARIL) 25 MG Cap      2. Advanced hepatic fibrosis        3. Pruritus  hydrOXYzine pamoate (VISTARIL) 25 MG Cap      4. Osteopenia, unspecified location        5. History of fatty infiltration of liver        6. Hyperlipidemia, unspecified hyperlipidemia type        7. Hypertension, unspecified type        8. Hepatic hemangioma          - Continue Ursodiol 500 mg PO BID.  - Continue Iqirvo 80 mg PO daily.   - Repeat labs in 3-6 months to monitor liver function.   - Recommend abdominal ultrasound, with AFP measurement for HCC surveillance every 6-12 months.  - Recommend Fibroscan every 2-3 years  to non-invasively restage liver disease.  - Recommend TSH and lipids be checked annually through PCP.  - Recommend DEXA scan every 2 years to monitor BMD, next due 12/2026.  - Maintain a healthy weight, through diet and exercise.  - Recommend good control of blood pressure, blood sugar and lipids.   - Avoid alcohol and herbal supplements/alternative remedies.  - Continue cholestyramine 1 packet PO TID PRN pruritus.   - Continue Hydroxyzine PRN for itching.   - Continue follow up with GI, as planned.  - Return to clinic in 6 months, with labs and US prior to visit.    Thank you for allowing me to participate in the care of Desirae Tatyana Etienne       Hepatology Nurse Practitioner  [facility]    CC'ed note to:   No ref. provider found  Ruth Mathias MD

## 2025-07-01 ENCOUNTER — OFFICE VISIT (OUTPATIENT)
Dept: GASTROENTEROLOGY | Facility: CLINIC | Age: 65
End: 2025-07-01
Payer: COMMERCIAL

## 2025-07-01 VITALS
SYSTOLIC BLOOD PRESSURE: 139 MMHG | DIASTOLIC BLOOD PRESSURE: 91 MMHG | OXYGEN SATURATION: 98 % | HEART RATE: 71 BPM | WEIGHT: 159.63 LBS | HEIGHT: 67 IN | BODY MASS INDEX: 25.06 KG/M2 | RESPIRATION RATE: 16 BRPM

## 2025-07-01 DIAGNOSIS — K58.1 IRRITABLE BOWEL SYNDROME WITH CONSTIPATION: ICD-10-CM

## 2025-07-01 DIAGNOSIS — K21.9 GASTROESOPHAGEAL REFLUX DISEASE, UNSPECIFIED WHETHER ESOPHAGITIS PRESENT: Primary | ICD-10-CM

## 2025-07-01 DIAGNOSIS — K74.3 PRIMARY BILIARY CHOLANGITIS: ICD-10-CM

## 2025-07-01 DIAGNOSIS — Z86.0100 HISTORY OF COLON POLYPS: ICD-10-CM

## 2025-07-01 PROCEDURE — 1125F AMNT PAIN NOTED PAIN PRSNT: CPT | Mod: CPTII,S$GLB,,

## 2025-07-01 PROCEDURE — 3008F BODY MASS INDEX DOCD: CPT | Mod: CPTII,S$GLB,,

## 2025-07-01 PROCEDURE — 1159F MED LIST DOCD IN RCRD: CPT | Mod: CPTII,S$GLB,,

## 2025-07-01 PROCEDURE — 3075F SYST BP GE 130 - 139MM HG: CPT | Mod: CPTII,S$GLB,,

## 2025-07-01 PROCEDURE — 3080F DIAST BP >= 90 MM HG: CPT | Mod: CPTII,S$GLB,,

## 2025-07-01 PROCEDURE — 99999 PR PBB SHADOW E&M-EST. PATIENT-LVL IV: CPT | Mod: PBBFAC,,,

## 2025-07-01 PROCEDURE — 99213 OFFICE O/P EST LOW 20 MIN: CPT | Mod: S$GLB,,,

## 2025-07-01 RX ORDER — TENAPANOR HYDROCHLORIDE 53.2 MG/1
50 TABLET ORAL 2 TIMES DAILY
Qty: 90 TABLET | Refills: 3 | Status: SHIPPED | OUTPATIENT
Start: 2025-07-01 | End: 2025-09-29

## 2025-07-01 NOTE — PROGRESS NOTES
Subjective:       Patient ID: Desirae Etienne is a 64 y.o. female Body mass index is 25 kg/m².    Chief Complaint: Medication Refill, Gas, and Bloated    This patient is new to me.  Referring Provider: No ref. provider found for constipation.              GI Problem  Primary symptoms do not include fever, weight loss, fatigue, abdominal pain, nausea, vomiting, diarrhea, melena, hematemesis, jaundice, hematochezia, dysuria, myalgias, arthralgias or rash.   The illness is also significant for constipation (hx of chronic constipation, takes daily softner with daily Ibsrela 50mcg BID daily, has 3-4bms/week, strains occasionally has been on Linzess in the past states takes chronic opioids daily for pain). The illness does not include dysphagia or bloating. Associated symptoms comments:   Colonoscopy 6/3/24  Impression:            - Non-bleeding internal hemorrhoids.                         - Melanosis in the colon. Biopsied.                         - One 5 mm polyp in the cecum, removed with a cold                         snare. Resected and retrieved.                         - One 20 mm polyp in the ascending colon, removed                         with a hot snare. Resected and retrieved.                         - One 16 mm polyp in the cecum, removed with a hot                         snare. Resected and retrieved. Injected. Clip was                         placed. Clip : [email or web address].                         - The rectum, ileocecal valve and appendiceal                         orifice are normal.                         - The examined portion of the ileum was normal.  benign and is the adenomatous type     Procedure Date: 5/20/2024  Attending MD: Landon Vázquez MD,  Impression:            - Normal esophagus.                         - Z-line regular, 37 cm from the incisors.                         - Medium-sized hiatal hernia.                         - Gastritis. Biopsied.                          - Normal examined duodenum.  biopsies reviewed and showed no bacteria.      . Significant associated medical issues include GERD and liver disease (Patient followed by hepatology for JOSHI, primary biliary cholangitis, advanced hepatic fibrosis). Associated medical issues do not include inflammatory bowel disease, gallstones, alcohol abuse, PUD, gastric bypass, bowel resection, irritable bowel syndrome, hemorrhoids or diverticulitis.   Gastroesophageal Reflux  She reports no abdominal pain, no belching, no chest pain, no choking, no coughing, no dysphagia, no early satiety, no globus sensation, no heartburn, no hoarse voice, no nausea or no water brash. This is a chronic problem. The current episode started more than 1 year ago. The problem occurs frequently. The problem has been waxing and waning. The heartburn is located in the substernum. The heartburn wakes her from sleep. The heartburn does not limit her activity. The heartburn doesn't change with position. Pertinent negatives include no anemia, fatigue, melena, muscle weakness, orthopnea or weight loss. Risk factors include smoking/tobacco exposure. She has tried a PPI (takes omeprazole 40mg orally BID daily) for the symptoms. The treatment provided significant relief. Past procedures do not include an abdominal ultrasound, an EGD (Has not had an EGD in the past), esophageal manometry, esophageal pH monitoring, H. pylori antibody titer or a UGI. Past invasive treatments do not include gastroplasty, gastroplication or reflux surgery.       Review of Systems   Constitutional:  Negative for fatigue, fever and weight loss.   HENT:  Negative for hoarse voice.    Respiratory:  Negative for cough and choking.    Cardiovascular:  Negative for chest pain.   Gastrointestinal:  Positive for constipation (hx of chronic constipation, takes daily softner with daily Ibsrela 50mcg BID daily, has 3-4bms/week, strains occasionally has been on Linzess in the past states  takes chronic opioids daily for pain). Negative for abdominal distention, abdominal pain, anal bleeding, bloating, blood in stool, diarrhea, dysphagia, heartburn, hematemesis, hematochezia, jaundice, melena, nausea, rectal pain and vomiting.   Genitourinary:  Negative for dysuria.   Musculoskeletal:  Negative for arthralgias, myalgias and muscle weakness.   Skin:  Negative for rash.         No LMP recorded. Patient has had a hysterectomy.  Past Medical History:   Diagnosis Date    Advanced hepatic fibrosis     Arthritis     Chronic back pain     Fatty liver     Fibromyalgia     GERD (gastroesophageal reflux disease)     HTN (hypertension)     Primary biliary cholangitis      Past Surgical History:   Procedure Laterality Date    APPENDECTOMY      CHOLECYSTECTOMY      COLONOSCOPY N/A 6/3/2024    Procedure: COLONOSCOPY;  Surgeon: Landon Monteiro MD;  Location: [facility];  Service: Endoscopy;  Laterality: N/A;    ENDOSCOPIC ULTRASOUND OF UPPER GASTROINTESTINAL TRACT N/A 07/29/2022    Procedure: ULTRASOUND, UPPER GI TRACT, ENDOSCOPIC;  Surgeon: Favian Valdes MD;  Location: [facility] (2ND FLR);  Service: Endoscopy;  Laterality: N/A;  From: Vladimir Pandya MD   Sent: 6/30/2022   2:37 PM CDT   To: Yuliana Montero MA   EUS +/- ERCP     Pt is fully vaccinated-DS  7/6/22-Instructions sent via portal-DS  7/22/22-Pt's  confirmed new arrival time of 10:00am-DS  7/28/22-Unable     ERCP N/A 07/29/2022    Procedure: ERCP (ENDOSCOPIC RETROGRADE CHOLANGIOPANCREATOGRAPHY);  Surgeon: Favian Valdes MD;  Location: [facility] (2ND FLR);  Service: Endoscopy;  Laterality: N/A;  From: Vladimir Pandya MD   Sent: 6/30/2022   2:37 PM CDT   To: Yuliana Montero MA     EUS +/- ERCP       ESOPHAGOGASTRODUODENOSCOPY N/A 5/20/2024    Procedure: EGD (ESOPHAGOGASTRODUODENOSCOPY);  Surgeon: Landon Monteiro MD;  Location: [facility];  Service: Endoscopy;  Laterality: N/A;    LAPAROSCOPIC CHOLECYSTECTOMY WITH CHOLANGIOGRAPHY  10/27/2020     LIVER BIOPSY  10/27/2020    TOTAL KNEE ARTHROPLASTY       Family History   Problem Relation Name Age of Onset    Colon cancer Paternal Uncle      Crohn's disease Neg Hx      Liver cancer Neg Hx      Liver disease Neg Hx       Social History     Tobacco Use    Smoking status: Every Day     Current packs/day: 1.00     Types: Cigarettes   Substance Use Topics    Alcohol use: Never    Drug use: Never     Wt Readings from Last 10 Encounters:   07/01/25 72.4 kg (159 lb 9.8 oz)   06/27/25 69 kg (152 lb 1.9 oz)   01/03/25 71.8 kg (158 lb 4.6 oz)   05/20/24 62.1 kg (137 lb)   02/09/24 62.3 kg (137 lb 5.6 oz)   01/26/24 62.5 kg (137 lb 12.6 oz)   07/24/23 64 kg (141 lb)   07/29/22 70.3 kg (155 lb)   11/03/21 77.7 kg (171 lb 4.8 oz)   04/29/21 85 kg (187 lb 6.3 oz)     Lab Results   Component Value Date    WBC 9 12/20/2024    HGB 13.5 12/20/2024    HCT 42.8 12/20/2024    MCV 91.6 12/20/2024     12/20/2024     CMP  Sodium   Date Value Ref Range Status   09/26/2022 137 136 - 145 mmol/L Final   11/03/2021 141 136 - 145 mmol/L Final     Potassium   Date Value Ref Range Status   09/26/2022 4.9 3.5 - 5.1 mmol/L Final   11/03/2021 4.6 3.5 - 5.1 mmol/L Final     Chloride   Date Value Ref Range Status   09/26/2022 104 98 - 107 mmol/L Final   11/03/2021 104 95 - 110 mmol/L Final     CO2   Date Value Ref Range Status   09/26/2022 33 (H) 21 - 32 mmol/L Final   11/03/2021 26 23 - 29 mmol/L Final     Glucose   Date Value Ref Range Status   11/03/2021 74 70 - 110 mg/dL Final     BUN   Date Value Ref Range Status   09/26/2022 11 7 - 18 mg/dL Final   11/03/2021 13 8 - 23 mg/dL Final     Creatinine   Date Value Ref Range Status   09/26/2022 0.82 0.55 - 1.02 mg/dL Final   11/03/2021 1.2 0.5 - 1.4 mg/dL Final     Calcium   Date Value Ref Range Status   09/26/2022 9.6 8.5 - 10.1 mg/dL Final   11/03/2021 10.0 8.7 - 10.5 mg/dL Final     Total Protein   Date Value Ref Range Status   11/03/2021 7.2 6.0 - 8.4 g/dL Final     Albumin   Date  "Value Ref Range Status   11/03/2021 3.8 3.5 - 5.2 g/dL Final     Albumin Level   Date Value Ref Range Status   05/06/2025 4.6 3.2 - 4.8 g/dL Final     Total Bilirubin   Date Value Ref Range Status   11/03/2021 0.5 0.1 - 1.0 mg/dL Final     Comment:     For infants and newborns, interpretation of results should be based  on gestational age, weight and in agreement with clinical  observations.    Premature Infant recommended reference ranges:  Up to 24 hours.............<8.0 mg/dL  Up to 48 hours............<12.0 mg/dL  3-5 days..................<15.0 mg/dL  6-29 days.................<15.0 mg/dL       Alkaline Phosphatase   Date Value Ref Range Status   11/03/2021 385 (H) 55 - 135 U/L Final     Alk Phos   Date Value Ref Range Status   05/06/2025 129 (H) 46 - 116 IU/L Final     AST   Date Value Ref Range Status   11/03/2021 27 10 - 40 U/L Final     Aspartate Aminotransferase   Date Value Ref Range Status   05/06/2025 16 <34 IU/L Final     ALT   Date Value Ref Range Status   11/03/2021 17 10 - 44 U/L Final     Alanine Aminotransferase   Date Value Ref Range Status   05/06/2025 <7 (L) 10 - 49 IU/L Final     Anion Gap   Date Value Ref Range Status   11/03/2021 11 8 - 16 mmol/L Final     eGFR if    Date Value Ref Range Status   11/03/2021 56.4 (A) >60 mL/min/1.73 m^2 Final     eGFR    Date Value Ref Range Status   09/26/2022 >60 >60 mL/min/1.73m2 Final     eGFR if non    Date Value Ref Range Status   11/03/2021 48.9 (A) >60 mL/min/1.73 m^2 Final     Comment:     Calculation used to obtain the estimated glomerular filtration  rate (eGFR) is the CKD-EPI equation.        eGFR   Date Value Ref Range Status   09/26/2022 >60 >60 mL/min/1.73m2 Final     No results found for: "LIPASE"  No results found for: "LIPASERES"  No results found for: "TSH"    Reviewed prior medical records including [facility] office visit 01/26/2024 radiology report of ultrasound abdomen 12/20/2023 & " endoscopy history (see surgical history/procedures).    Objective:      Physical Exam  Vitals and nursing note reviewed.   Constitutional:       Appearance: Normal appearance. She is normal weight.   Cardiovascular:      Rate and Rhythm: Normal rate and regular rhythm.      Heart sounds: Normal heart sounds.   Pulmonary:      Breath sounds: Normal breath sounds.   Abdominal:      General: Bowel sounds are normal.      Palpations: Abdomen is soft.      Tenderness: There is no abdominal tenderness.   Skin:     General: Skin is warm and dry.      Coloration: Skin is not jaundiced.   Neurological:      Mental Status: She is alert and oriented to person, place, and time.   Psychiatric:         Mood and Affect: Mood normal.         Behavior: Behavior normal.         Assessment:       1. Gastroesophageal reflux disease, unspecified whether esophagitis present    2. Irritable bowel syndrome with constipation    3. History of colon polyps    4. Primary biliary cholangitis          Plan:         Gastroesophageal reflux disease, unspecified whether esophagitis present,   -   CONTomeprazole (PRILOSEC) 40 MG capsule; Take 1 capsule (40 mg total) by mouth once daily.  Dispense: 90 capsule; Refill: 1   -Take PPI 30min-1hr before eating breakfast  -Educated patient on lifestyle modifications to help control/reduce reflux/abdominal pain including: avoid large meals, avoid eating within 2-3 hours of bedtime (avoid late night eating & lying down soon after eating), elevate head of bed if nocturnal symptoms are present, smoking cessation (if current smoker), & weight loss (if overweight).   -Educated to avoid known foods which trigger reflux symptoms & to minimize/avoid high-fat foods, chocolate, caffeine, citrus, alcohol, & tomato products.  -Advised to avoid/limit use of NSAID's, since they can cause GI upset, bleeding, and/or ulcers. If needed, take with food.     Irritable bowel syndrome with constipation  -CONT Ibsrela 50mcg BID  daily  -Recommend daily exercise as tolerated, adequate water intake (six 8-oz glasses of water daily), and high fiber diet. OTC fiber supplements are recommended if diet does not reach daily fiber goal (20-30 grams daily), such as Metamucil, Citrucel, or FiberCon (take as directed, separate from other oral medications by >2 hours).  -Recommend trying OTC MiraLax once daily (17g PO) as directed  -If still no improvement with these measures, call/follow-up    History of colon polyps   Next surveillance colonoscopy due on 06/2027    Primary biliary cholangitis   -continue follow up with hepatology   For fatty liver recommend: low fat, low cholesterol diet, maintain good control of blood sugars and cholesterol levels, exercise, weight loss (if overweight), minimize/avoid alcohol and tylenol products, & follow-up with PCP for continued evaluation and management; if specialist is needed, recommend seeing hepatology.          Follow up if symptoms worsen or fail to improve.      If no improvement in symptoms or symptoms worsen, call/follow-up at clinic or go to ER.       [facility] - GASTROENTEROLOGY  [facility], [place]     Dictation software program was used for this note. Please expect some simple typographical  errors in this note.    Encounter includes face to face time and non-face to face time preparing to see the patient (eg, review of tests), obtaining and/or reviewing separately obtained history, documenting clinical information in the electronic or other health record, independently interpreting results (not separately reported) and communicating results to the patient/family/caregiver, or care coordination (not separately reported).

## 2025-08-01 ENCOUNTER — PATIENT MESSAGE (OUTPATIENT)
Dept: ADMINISTRATIVE | Facility: OTHER | Age: 65
End: 2025-08-01
Payer: COMMERCIAL

## 2025-08-29 ENCOUNTER — PATIENT MESSAGE (OUTPATIENT)
Dept: ADMINISTRATIVE | Facility: OTHER | Age: 65
End: 2025-08-29
Payer: COMMERCIAL